# Patient Record
Sex: MALE | Race: WHITE | NOT HISPANIC OR LATINO
[De-identification: names, ages, dates, MRNs, and addresses within clinical notes are randomized per-mention and may not be internally consistent; named-entity substitution may affect disease eponyms.]

---

## 2018-09-03 PROBLEM — Z00.00 ENCOUNTER FOR PREVENTIVE HEALTH EXAMINATION: Status: ACTIVE | Noted: 2018-09-03

## 2018-10-03 ENCOUNTER — APPOINTMENT (OUTPATIENT)
Dept: ENDOCRINOLOGY | Facility: CLINIC | Age: 68
End: 2018-10-03
Payer: MEDICARE

## 2018-10-03 VITALS
HEIGHT: 63 IN | SYSTOLIC BLOOD PRESSURE: 126 MMHG | BODY MASS INDEX: 29.95 KG/M2 | DIASTOLIC BLOOD PRESSURE: 74 MMHG | WEIGHT: 169 LBS | HEART RATE: 59 BPM

## 2018-10-03 DIAGNOSIS — D56.9 THALASSEMIA, UNSPECIFIED: ICD-10-CM

## 2018-10-03 DIAGNOSIS — I25.10 ATHEROSCLEROTIC HEART DISEASE OF NATIVE CORONARY ARTERY W/OUT ANGINA PECTORIS: ICD-10-CM

## 2018-10-03 DIAGNOSIS — H26.9 UNSPECIFIED CATARACT: ICD-10-CM

## 2018-10-03 LAB
CREAT SERPL-MCNC: 0.68
GLUCOSE BLDC GLUCOMTR-MCNC: 144
HBA1C MFR BLD HPLC: 7.8
LDLC SERPL DIRECT ASSAY-MCNC: 102

## 2018-10-03 PROCEDURE — 99214 OFFICE O/P EST MOD 30 MIN: CPT | Mod: 25

## 2018-10-03 PROCEDURE — 82962 GLUCOSE BLOOD TEST: CPT

## 2018-10-03 RX ORDER — AMLODIPINE BESYLATE 10 MG/1
10 TABLET ORAL DAILY
Refills: 0 | Status: ACTIVE | COMMUNITY

## 2018-10-03 RX ORDER — REPAGLINIDE 1 MG/1
1 TABLET ORAL
Qty: 540 | Refills: 1 | Status: DISCONTINUED | COMMUNITY
End: 2018-10-03

## 2018-10-03 RX ORDER — IRBESARTAN 300 MG/1
300 TABLET ORAL DAILY
Refills: 0 | Status: ACTIVE | COMMUNITY

## 2018-10-05 RX ORDER — SITAGLIPTIN 100 MG/1
100 TABLET, FILM COATED ORAL
Qty: 90 | Refills: 1 | Status: DISCONTINUED | COMMUNITY
Start: 2018-10-03 | End: 2018-10-05

## 2018-10-10 ENCOUNTER — RX RENEWAL (OUTPATIENT)
Age: 68
End: 2018-10-10

## 2019-01-17 ENCOUNTER — RECORD ABSTRACTING (OUTPATIENT)
Age: 69
End: 2019-01-17

## 2019-01-17 DIAGNOSIS — Z78.9 OTHER SPECIFIED HEALTH STATUS: ICD-10-CM

## 2019-01-17 DIAGNOSIS — Z86.39 PERSONAL HISTORY OF OTHER ENDOCRINE, NUTRITIONAL AND METABOLIC DISEASE: ICD-10-CM

## 2019-01-17 DIAGNOSIS — Z83.3 FAMILY HISTORY OF DIABETES MELLITUS: ICD-10-CM

## 2019-01-17 DIAGNOSIS — Z86.79 PERSONAL HISTORY OF OTHER DISEASES OF THE CIRCULATORY SYSTEM: ICD-10-CM

## 2019-01-29 ENCOUNTER — APPOINTMENT (OUTPATIENT)
Dept: ENDOCRINOLOGY | Facility: CLINIC | Age: 69
End: 2019-01-29
Payer: MEDICARE

## 2019-01-29 VITALS
HEIGHT: 64 IN | DIASTOLIC BLOOD PRESSURE: 66 MMHG | BODY MASS INDEX: 28.68 KG/M2 | WEIGHT: 168 LBS | HEART RATE: 70 BPM | SYSTOLIC BLOOD PRESSURE: 126 MMHG

## 2019-01-29 LAB
CHOLEST SERPL-MCNC: 153
GLUCOSE BLDC GLUCOMTR-MCNC: 135
HBA1C MFR BLD HPLC: 6.6
LDLC SERPL DIRECT ASSAY-MCNC: 77
MICROALBUMIN/CREAT 24H UR-RTO: 31

## 2019-01-29 PROCEDURE — 99214 OFFICE O/P EST MOD 30 MIN: CPT | Mod: 25

## 2019-01-29 PROCEDURE — 82962 GLUCOSE BLOOD TEST: CPT

## 2019-02-18 ENCOUNTER — RX RENEWAL (OUTPATIENT)
Age: 69
End: 2019-02-18

## 2019-02-21 ENCOUNTER — RX RENEWAL (OUTPATIENT)
Age: 69
End: 2019-02-21

## 2019-02-26 ENCOUNTER — MEDICATION RENEWAL (OUTPATIENT)
Age: 69
End: 2019-02-26

## 2019-02-27 ENCOUNTER — MEDICATION RENEWAL (OUTPATIENT)
Age: 69
End: 2019-02-27

## 2019-02-28 RX ORDER — FENOFIBRATE 160 MG/1
160 TABLET ORAL DAILY
Qty: 90 | Refills: 1 | Status: ACTIVE | COMMUNITY
Start: 1900-01-01 | End: 1900-01-01

## 2019-04-01 ENCOUNTER — RX RENEWAL (OUTPATIENT)
Age: 69
End: 2019-04-01

## 2019-04-02 ENCOUNTER — RX RENEWAL (OUTPATIENT)
Age: 69
End: 2019-04-02

## 2019-05-08 ENCOUNTER — MEDICATION RENEWAL (OUTPATIENT)
Age: 69
End: 2019-05-08

## 2019-05-08 ENCOUNTER — RX RENEWAL (OUTPATIENT)
Age: 69
End: 2019-05-08

## 2019-05-23 ENCOUNTER — MEDICATION RENEWAL (OUTPATIENT)
Age: 69
End: 2019-05-23

## 2019-06-28 LAB
HBA1C MFR BLD HPLC: 7.4
LDLC SERPL CALC-MCNC: 67

## 2019-07-02 ENCOUNTER — APPOINTMENT (OUTPATIENT)
Dept: ENDOCRINOLOGY | Facility: CLINIC | Age: 69
End: 2019-07-02
Payer: MEDICARE

## 2019-07-02 VITALS
BODY MASS INDEX: 28.68 KG/M2 | OXYGEN SATURATION: 95 % | HEIGHT: 64 IN | SYSTOLIC BLOOD PRESSURE: 110 MMHG | WEIGHT: 168 LBS | HEART RATE: 68 BPM | DIASTOLIC BLOOD PRESSURE: 70 MMHG

## 2019-07-02 DIAGNOSIS — Z79.899 OTHER LONG TERM (CURRENT) DRUG THERAPY: ICD-10-CM

## 2019-07-02 DIAGNOSIS — R94.6 ABNORMAL RESULTS OF THYROID FUNCTION STUDIES: ICD-10-CM

## 2019-07-02 LAB — GLUCOSE BLDC GLUCOMTR-MCNC: 128

## 2019-07-02 PROCEDURE — 99214 OFFICE O/P EST MOD 30 MIN: CPT | Mod: 25

## 2019-07-02 PROCEDURE — 82962 GLUCOSE BLOOD TEST: CPT

## 2019-07-02 RX ORDER — REPAGLINIDE 1 MG/1
1 TABLET ORAL
Qty: 540 | Refills: 1 | Status: DISCONTINUED | COMMUNITY
Start: 1900-01-01 | End: 2019-07-02

## 2019-07-02 RX ORDER — BLOOD SUGAR DIAGNOSTIC
STRIP MISCELLANEOUS
Qty: 400 | Refills: 1 | Status: DISCONTINUED | COMMUNITY
End: 2019-07-02

## 2019-07-02 NOTE — DATA REVIEWED
[FreeTextEntry1] : Labs 9/28/18\par Cr 1.09\par LDl chol 67\par Tg 188\par A1c 7.8%\par TSH 4.720\par urine microalb/Cr 37\par \par Labs 1/24/19\par Cr 1.32, GFR 54\par LDL chol 77, Tg 163\par A1c 6.6%\par TSH 2.780, FT4 1.45\par urine microalb/Cr 31\par \par Labs 6/25/19\par Gluc 133, A1c 7.4\par CR 1.32, GFR 54\par LDL 67, HDL 40, Tg 132\par urine alb/Cr 50

## 2019-07-02 NOTE — REVIEW OF SYSTEMS
[Recent Weight Gain (___ Lbs)] : no recent weight gain [Recent Weight Loss (___ Lbs)] : no recent weight loss [Visual Field Defect] : no visual field defect [Blurry Vision] : no blurred vision [Chest Pain] : no chest pain [Palpitations] : no palpitations [Shortness Of Breath] : no shortness of breath [SOB on Exertion] : no shortness of breath during exertion [Vomiting] : no vomiting was observed [Nausea] : no nausea [Abdominal Pain] : no abdominal pain [Polyuria] : no polyuria [Nocturia] : no nocturia [Pain/Numbness of Digits] : no pain/numbness of digits [Depression] : no depression [Anxiety] : no anxiety [Polydipsia] : no polydipsia

## 2019-07-02 NOTE — HISTORY OF PRESENT ILLNESS
[FreeTextEntry1] : Brother killed in MVA in [place].\par \par Quality: Type 2\par Severity: moderate\par Duration: 2013\par Onset: blood test\par \par ASSOCIATED SYMPTOMS/COMPLICATIONS:\par (+) microalbuminuria 1/2019 on ARB\par (+) CAD s/p CABG 3 vessel 2004\par 1/8/19 eye exam no DR\par \par MODIFYING FACTORS: improved with meds, worsening since death of brother - very stressed/upset\par Diet: eats before flies (2 eggs/ham/roll), eats 3 meals daily\par Exercise: limited due to bad knee\par Current DM meds:\par \par Current DM meds: no meds before flight\par Prandin 1 mg 2 tabs with each meal\par  mg 2 tabs BID\par Alogliptin 25 mg daily\par \par SMBG: no logs, testing 4x daily, per pt: fasting numbers 180-190's, after meals 130's, bedtime 140's. denies lows.\par \par \par

## 2019-07-02 NOTE — ASSESSMENT
[FreeTextEntry1] : 1. T2DM comp by CAD s/p CABG and microalbuminuria - suboptimal A1c, fasting hyperglycemia\par - try bedtime snack\par - risks of worsening diabetes discussed\par - cont MFN, cont Prandin, cont Alogliptin\par - pt refused insulin, GLP1 agonist, SGLT2 inhibitors. not candidate for TZD (does not want to take any DM meds that is no FAA approved b/c he is a ), will check FAA list\par - pt refused Freestyle Terrie sensor\par - cont to monitor FS, brad before flight\par - cont ARB\par - cardio f/u\par \par 2. HLD - LDL ok, cont statin\par \par 3. HTN - BP ok, cont current BP meds

## 2019-07-02 NOTE — ADDENDUM
[FreeTextEntry1] : looked at Health system website - list updated 6/29/19 - pt allowed up to 3 oral DM meds, 4 meds required examination with FFA medical examiner.  ALogliptin and Prandin is not an acceptable combination.  Stop Prandin and switch to Glipizide 5 mg daily. pt advised to test more on the new med and call with problems.  hold off on flying until he has time to see how new med is working for him

## 2019-07-02 NOTE — PHYSICAL EXAM
[Alert] : alert [No Acute Distress] : no acute distress [Well Nourished] : well nourished [Well Developed] : well developed [Normal Sclera/Conjunctiva] : normal sclera/conjunctiva [EOMI] : extra ocular movement intact [Normal Rate and Effort] : normal respiratory rhythm and effort [Clear to Auscultation] : lungs were clear to auscultation bilaterally [Normal Rate] : heart rate was normal  [Normal S1, S2] : normal S1 and S2 [No Edema] : there was no peripheral edema [Normal Bowel Sounds] : normal bowel sounds [Not Tender] : non-tender [Soft] : abdomen soft [Not Distended] : not distended [Normal Gait] : normal gait [Cranial Nerves Intact] : cranial nerves 2-12 were intact [Normal Reflexes] : deep tendon reflexes were 2+ and symmetric [Oriented x3] : oriented to person, place, and time [Normal Insight/Judgement] : insight and judgment were intact [Normal Affect] : the affect was normal [Normal Mood] : the mood was normal [Right Foot Was Examined] : right foot ~C was examined [Left Foot Was Examined] : left foot ~C was examined [2+] : 2+ in the dorsalis pedis [Foot Ulcers] : no foot ulcers [Vibration Dec.] : normal vibratory sensation at the level of the toes [Position Sense Dec.] : normal position sense at the level of the toes

## 2019-07-03 ENCOUNTER — TRANSCRIPTION ENCOUNTER (OUTPATIENT)
Age: 69
End: 2019-07-03

## 2019-07-03 ENCOUNTER — MEDICATION RENEWAL (OUTPATIENT)
Age: 69
End: 2019-07-03

## 2019-11-04 LAB
HBA1C MFR BLD HPLC: 7.3
LDLC SERPL DIRECT ASSAY-MCNC: 54

## 2019-11-05 ENCOUNTER — APPOINTMENT (OUTPATIENT)
Dept: ENDOCRINOLOGY | Facility: CLINIC | Age: 69
End: 2019-11-05
Payer: MEDICARE

## 2019-11-05 VITALS
WEIGHT: 168 LBS | HEIGHT: 64 IN | HEART RATE: 61 BPM | SYSTOLIC BLOOD PRESSURE: 148 MMHG | DIASTOLIC BLOOD PRESSURE: 68 MMHG | BODY MASS INDEX: 28.68 KG/M2

## 2019-11-05 VITALS — SYSTOLIC BLOOD PRESSURE: 136 MMHG | DIASTOLIC BLOOD PRESSURE: 70 MMHG

## 2019-11-05 DIAGNOSIS — R74.0 NONSPECIFIC ELEVATION OF LEVELS OF TRANSAMINASE AND LACTIC ACID DEHYDROGENASE [LDH]: ICD-10-CM

## 2019-11-05 DIAGNOSIS — Z12.5 ENCOUNTER FOR SCREENING FOR MALIGNANT NEOPLASM OF PROSTATE: ICD-10-CM

## 2019-11-05 LAB — GLUCOSE BLDC GLUCOMTR-MCNC: 103

## 2019-11-05 PROCEDURE — 99215 OFFICE O/P EST HI 40 MIN: CPT | Mod: 25

## 2019-11-05 PROCEDURE — 82962 GLUCOSE BLOOD TEST: CPT

## 2019-11-05 NOTE — DATA REVIEWED
[FreeTextEntry1] : Labs 9/28/18\par Cr 1.09\par LDl chol 67\par Tg 188\par A1c 7.8%\par TSH 4.720\par urine microalb/Cr 37\par \par Labs 1/24/19\par Cr 1.32, GFR 54\par LDL chol 77, Tg 163\par A1c 6.6%\par TSH 2.780, FT4 1.45\par urine microalb/Cr 31\par \par Labs 6/25/19\par Gluc 133, A1c 7.4\par CR 1.32, GFR 54\par LDL 67, HDL 40, Tg 132\par urine alb/Cr 50\par \par Labs 10/30/19\par Gluc 134, A1c 7.3%\par Cr 1.32, GFR 54\par ALT 57\par LDL 54, HDL 43, Tg 154\par B12 866

## 2019-11-05 NOTE — ASSESSMENT
[FreeTextEntry1] : 1. T2DM comp by CAD s/p CABG and microalbuminuria - suboptimal A1c, fasting hyperglycemia\par - cont MFN, cont Alogliptin\par - increase Glipizide 5 mg BID\par - pt refused insulin, GLP1 agonist, SGLT2 inhibitors. not candidate for TZD (does not want to take any DM meds that is no FAA approved b/c he is a )\par - pt refused Freestyle Terrie sensor\par - cont to monitor FS, brad before flight\par - cont ARB\par - cardio f/u\par FAA website - list updated 6/29/19 - pt allowed up to 3 oral DM meds, 4 meds required examination with FFA medical examiner.  ALogliptin and Prandin is not an acceptable combination.  Stop Prandin and switch to Glipizide 5 mg daily. pt advised to test more on the new med and call with problems.  hold off on flying until he has time to see how new med is working for him\par \par 2. HLD - LDL ok, cont statin\par \par 3. HTN - BP elevated, cont current BP meds, advised cardio f/u\par \par 4. elevated ALT - PCP f/u\par \par 5. pt requesting PSA screen\par \par

## 2019-11-05 NOTE — HISTORY OF PRESENT ILLNESS
[FreeTextEntry1] : no issues today\par \par Quality: Type 2\par Severity: moderate\par Duration: 2013\par Onset: blood test\par \par ASSOCIATED SYMPTOMS/COMPLICATIONS:\par (+) microalbuminuria 1/2019 on ARB\par (+) CAD s/p CABG 3 vessel 2004\par 1/8/19 eye exam no DR\par \par MODIFYING FACTORS: improved with meds, worsening since death of brother and limited exercise\par Diet: eats before flies (2 eggs/ham/roll), eats 3 meals daily, crackers at bedtime\par Exercise: limited due to bad knee, and torn rotator cuff \par \par Current DM meds: no meds before flight\par Glipizide 5 mg daily in am\par  mg 2 tabs BID\par Alogliptin 25 mg daily in am\par \par SMBG: no logs, testing 4x daily, per pt: fasting numbers 170's, after meals 120-130's denies lows.\par \par \par

## 2019-11-05 NOTE — REVIEW OF SYSTEMS
[Recent Weight Gain (___ Lbs)] : no recent weight gain [Recent Weight Loss (___ Lbs)] : no recent weight loss [Visual Field Defect] : no visual field defect [Blurry Vision] : no blurred vision [Chest Pain] : no chest pain [Palpitations] : no palpitations [Shortness Of Breath] : no shortness of breath [SOB on Exertion] : no shortness of breath during exertion [Nausea] : no nausea [Vomiting] : no vomiting was observed [Abdominal Pain] : no abdominal pain [Polyuria] : no polyuria [Nocturia] : no nocturia [Pain/Numbness of Digits] : no pain/numbness of digits [Depression] : no depression [Anxiety] : no anxiety [Polydipsia] : no polydipsia

## 2019-11-05 NOTE — PHYSICAL EXAM
[Alert] : alert [No Acute Distress] : no acute distress [Well Nourished] : well nourished [Well Developed] : well developed [Normal Sclera/Conjunctiva] : normal sclera/conjunctiva [EOMI] : extra ocular movement intact [Normal Rate and Effort] : normal respiratory rhythm and effort [Clear to Auscultation] : lungs were clear to auscultation bilaterally [Normal Rate] : heart rate was normal  [Normal S1, S2] : normal S1 and S2 [No Edema] : there was no peripheral edema [Normal Bowel Sounds] : normal bowel sounds [Not Tender] : non-tender [Soft] : abdomen soft [Not Distended] : not distended [Normal Gait] : normal gait [Cranial Nerves Intact] : cranial nerves 2-12 were intact [Normal Reflexes] : deep tendon reflexes were 2+ and symmetric [Oriented x3] : oriented to person, place, and time [Normal Insight/Judgement] : insight and judgment were intact [Normal Affect] : the affect was normal [Normal Mood] : the mood was normal

## 2020-03-13 ENCOUNTER — RX RENEWAL (OUTPATIENT)
Age: 70
End: 2020-03-13

## 2020-05-22 ENCOUNTER — RX RENEWAL (OUTPATIENT)
Age: 70
End: 2020-05-22

## 2020-07-07 ENCOUNTER — APPOINTMENT (OUTPATIENT)
Dept: ENDOCRINOLOGY | Facility: CLINIC | Age: 70
End: 2020-07-07
Payer: MEDICARE

## 2020-07-07 PROCEDURE — 99442: CPT | Mod: 95

## 2020-07-07 NOTE — HISTORY OF PRESENT ILLNESS
[FreeTextEntry1] : Interval Hx - not flying since pandemic. has been self isolating since 3/2020.  has not been to eye doctor, dentist and lab for blood draw. doing well and no symptoms.  \par \par Quality: Type 2\par Severity: moderate\par Duration: 2013\par Onset: blood test\par \par ASSOCIATED SYMPTOMS/COMPLICATIONS:\par (+) microalbuminuria 1/2019 on ARB\par (+) CAD s/p CABG 3 vessel 2004\par 1/8/19 eye exam no DR\par \par MODIFYING FACTORS: improved with meds\par Diet: worse due to diet., nonadherent with diet during pandemic\par Exercise: limited due to bad knee, and torn rotator cuff \par \par Current DM meds: no meds before flight\par Glipizide 5 mg daily in am\par  mg 2 tabs BID\par Alogliptin 25 mg daily in am\par \par SMBG: testing 4x daily,  fasting 's, 2 hours post meals 160's\par ====================================\par HLD  on simvastatin 80 mg nightly, fenofibrate 160 mg daily, ezetimibe 10 mg daily\par ====================================\par \par

## 2020-07-07 NOTE — REVIEW OF SYSTEMS
[Recent Weight Gain (___ Lbs)] : recent weight gain: [unfilled] lbs [Blurred Vision] : no blurred vision [Shortness Of Breath] : no shortness of breath [Chest Pain] : no chest pain [SOB on Exertion] : no shortness of breath on exertion [Nausea] : no nausea [Abdominal Pain] : no abdominal pain [Polyuria] : no polyuria [Pain/Numbness of Digits] : no pain/numbness of digits [Nocturia] : no nocturia [Depression] : no depression [Anxiety] : no anxiety [Polydipsia] : no polydipsia

## 2020-07-07 NOTE — ASSESSMENT
[FreeTextEntry1] : 1. T2DM comp by CAD s/p CABG and microalbuminuria - worsening control by history\par - cont MFN, cont Alogliptin\par - cont Glipizide 5 mg BID, may need dose increase but need to see labs first\par - pt refused insulin, GLP1 agonist, SGLT2 inhibitors. not candidate for TZD (does not want to take any DM meds that is no FAA approved b/c he is a )\par - cont to monitor FS, brad before flight\par - cont ARB\par - cardio f/u\par \par 2. HLD - cont statin\par \par 3. HTN -cont current BP meds\par \par \par

## 2020-07-07 NOTE — REASON FOR VISIT
[Medical Office: ([facility])___] : at the medical office located in  [Home] : at home, [unfilled] , at the time of the visit. [Verbal consent obtained from patient] : the patient, [unfilled] [Follow - Up] : a follow-up visit [Other___] : [unfilled] [DM Type 2] : DM Type 2

## 2020-11-10 ENCOUNTER — RX RENEWAL (OUTPATIENT)
Age: 70
End: 2020-11-10

## 2020-11-24 ENCOUNTER — APPOINTMENT (OUTPATIENT)
Dept: ENDOCRINOLOGY | Facility: CLINIC | Age: 70
End: 2020-11-24
Payer: MEDICARE

## 2020-11-24 PROCEDURE — 99442: CPT | Mod: 95

## 2020-11-24 RX ORDER — EZETIMIBE AND SIMVASTATIN 10; 80 MG/1; MG/1
10-80 TABLET ORAL
Qty: 90 | Refills: 0 | Status: ACTIVE | COMMUNITY
Start: 2020-11-19

## 2020-11-24 RX ORDER — ASPIRIN 81 MG/1
81 TABLET, CHEWABLE ORAL
Refills: 0 | Status: ACTIVE | COMMUNITY

## 2020-11-24 RX ORDER — SIMVASTATIN 80 MG/1
80 TABLET, FILM COATED ORAL DAILY
Qty: 90 | Refills: 1 | Status: DISCONTINUED | COMMUNITY
End: 2020-11-24

## 2020-11-24 RX ORDER — METFORMIN HYDROCHLORIDE 500 MG/1
500 TABLET, COATED ORAL
Qty: 360 | Refills: 1 | Status: DISCONTINUED | COMMUNITY
Start: 2019-05-08 | End: 2020-11-24

## 2020-11-24 RX ORDER — EZETIMIBE 10 MG/1
10 TABLET ORAL
Refills: 0 | Status: DISCONTINUED | COMMUNITY
End: 2020-11-24

## 2020-11-24 NOTE — HISTORY OF PRESENT ILLNESS
[FreeTextEntry1] : Interval Hx - not flying since pandemic. reports fasting hyperglycemia 's. (+) weight loss\par \par Quality: Type 2\par Severity: moderate\par Duration: 2013\par Onset: blood test\par \par ASSOCIATED SYMPTOMS/COMPLICATIONS:\par (+) microalbuminuria 1/2019 on ARB\par (+) CAD s/p CABG 3 vessel 2004\par 1/8/19 eye exam no DR\par \par MODIFYING FACTORS: improved with meds\par Diet: worse due to diet., nonadherent with diet during pandemic\par Exercise: limited due to bad knee, and torn rotator cuff \par \par Current DM meds: no meds before flight\par Glipizide 5 mg BID\par  mg 2 tabs BID\par Alogliptin 25 mg daily in am\par \par SMBG: testing 4x daily,  fasting 160-200's, daytime -170's. denies hypoglycemia\par ====================================\par HLD  on simvastatin 80 mg nightly, fenofibrate 160 mg daily, ezetimibe 10 mg daily\par ====================================\par \par

## 2020-11-24 NOTE — ASSESSMENT
[FreeTextEntry1] : 1. T2DM comp by CAD s/p CABG and microalbuminuria - worsening control by history w fasting hyperglycemia\par - cont Alogliptin\par - change MFN  mg 2 tabs BID\par - cont Glipizide 5 mg BID, take before BF and Dinner\par - pt refused insulin, GLP1 agonist, SGLT2 inhibitors. not candidate for TZD (does not want to take any DM meds that is not FAA approved b/c he is a )\par - cont to monitor FS, brad before flight\par - cont ARB\par - cardio f/u\par - needs dilated eye exam w ophthalmology\par \par 2. HLD - cont vytorin\par \par 3. HTN -cont current BP meds\par \par \par

## 2020-11-24 NOTE — REVIEW OF SYSTEMS
[Recent Weight Gain (___ Lbs)] : recent weight gain: [unfilled] lbs [Blurred Vision] : no blurred vision [Chest Pain] : no chest pain [Shortness Of Breath] : no shortness of breath [SOB on Exertion] : no shortness of breath on exertion [Nausea] : no nausea [Abdominal Pain] : no abdominal pain [Polyuria] : no polyuria [Nocturia] : no nocturia [Pain/Numbness of Digits] : no pain/numbness of digits [Depression] : no depression [Anxiety] : no anxiety [Polydipsia] : no polydipsia

## 2020-11-24 NOTE — REASON FOR VISIT
[Follow - Up] : a follow-up visit [DM Type 2] : DM Type 2 [Other___] : [unfilled] [Home] : at home, [unfilled] , at the time of the visit. [Medical Office: ([facility])___] : at the medical office located in  [Verbal consent obtained from patient] : the patient, [unfilled]

## 2020-11-24 NOTE — DATA REVIEWED
[FreeTextEntry1] : Labs 9/28/18\par Cr 1.09\par LDl chol 67\par Tg 188\par A1c 7.8%\par TSH 4.720\par urine microalb/Cr 37\par \par Labs 1/24/19\par Cr 1.32, GFR 54\par LDL chol 77, Tg 163\par A1c 6.6%\par TSH 2.780, FT4 1.45\par urine microalb/Cr 31\par \par Labs 6/25/19\par Gluc 133, A1c 7.4\par CR 1.32, GFR 54\par LDL 67, HDL 40, Tg 132\par urine alb/Cr 50\par \par Labs 10/30/19\par Gluc 134, A1c 7.3%\par Cr 1.32, GFR 54\par ALT 57\par LDL 54, HDL 43, Tg 154\par B12 866\par \par labs 11/18/20 \par gluc 191, A1c 7.7\par Cr 1.21. GFR 59\par LDL 84, Tg 180, HDL 40\par TSH 2.520\par urine alb/Cr 109

## 2020-11-25 DIAGNOSIS — I10 ESSENTIAL (PRIMARY) HYPERTENSION: ICD-10-CM

## 2020-12-01 ENCOUNTER — RX RENEWAL (OUTPATIENT)
Age: 70
End: 2020-12-01

## 2021-02-26 LAB
HBA1C MFR BLD HPLC: 7.9
LDLC SERPL DIRECT ASSAY-MCNC: 51
MICROALBUMIN/CREAT 24H UR-RTO: 115

## 2021-02-27 ENCOUNTER — APPOINTMENT (OUTPATIENT)
Dept: ENDOCRINOLOGY | Facility: CLINIC | Age: 71
End: 2021-02-27
Payer: MEDICARE

## 2021-02-27 PROCEDURE — 99443: CPT | Mod: 95

## 2021-02-27 RX ORDER — GLIPIZIDE 5 MG/1
5 TABLET ORAL TWICE DAILY
Qty: 180 | Refills: 1 | Status: DISCONTINUED | COMMUNITY
Start: 2019-07-02 | End: 2021-02-27

## 2021-02-27 RX ORDER — ALOGLIPTIN 25 MG/1
25 TABLET, FILM COATED ORAL
Qty: 90 | Refills: 1 | Status: DISCONTINUED | COMMUNITY
Start: 2018-10-05 | End: 2021-02-27

## 2021-02-27 NOTE — HISTORY OF PRESENT ILLNESS
[FreeTextEntry1] : Interval Hx - not flying since pandemic. \par /69 this am\par  this am, reports fasting hyperglycemia\par insurance will no longer cover alogliptin but prefer Tradjenta\par feets okay\par \par Quality: Type 2\par Severity: moderate\par Duration: 2013\par Onset: blood test\par \par ASSOCIATED SYMPTOMS/COMPLICATIONS:\par (+) microalbuminuria 1/2019 on ARB\par (+) CAD s/p CABG 3 vessel 2004\par 1/8/19 eye exam no DR\par \par MODIFYING FACTORS: worsening due to decreased activity\par Exercise: limited due to bad knee, and torn rotator cuff \par \par Current DM meds: no meds before flight\par Glipizide 5 mg BID before meals\par MFN  mg 2 tabs BID\par Alogliptin 25 mg daily in am\par \par SMBG: testing 3x daily, reports fasting hyperglycemia. am -160's, daytimes 160-180's. denies hypoglycemia\par ====================================\par HLD  on simvastatin 80 mg nightly, fenofibrate 160 mg daily, ezetimibe 10 mg daily\par ====================================\par \par

## 2021-02-27 NOTE — ASSESSMENT
[FreeTextEntry1] : 1. T2DM comp by CAD s/p CABG and microalbuminuria - worsening control\par - cont Alogliptin, change to Tradjenta due to insurance\par - cont MFN  mg 2 tabs BID\par - cont Glipizide 5 mg BID, take before BF and Dinner but change to ER\par - pt refused insulin,  not candidate for TZD, does not want to take any DM meds that is not FAA approved b/c he is a , ATIF cooper reviewed - FAA does not allow SGLT2i w YANES, will consider [id number] to replace Tradjenta in 1 month if FS not improved w Glipizide ER\par - cont to monitor FS, brad before flight, call office in 1 month w FS logs\par - cont ARB\par - cardio f/u\par - needs dilated eye exam w ophthalmology\par \par 2. HLD - cont vytorin and fibrate\par \par \par \par

## 2021-02-27 NOTE — DATA REVIEWED
[FreeTextEntry1] : Labs 9/28/18\par Cr 1.09\par LDl chol 67\par Tg 188\par A1c 7.8%\par TSH 4.720\par urine microalb/Cr 37\par \par Labs 1/24/19\par Cr 1.32, GFR 54\par LDL chol 77, Tg 163\par A1c 6.6%\par TSH 2.780, FT4 1.45\par urine microalb/Cr 31\par \par Labs 6/25/19\par Gluc 133, A1c 7.4\par CR 1.32, GFR 54\par LDL 67, HDL 40, Tg 132\par urine alb/Cr 50\par \par Labs 10/30/19\par Gluc 134, A1c 7.3%\par Cr 1.32, GFR 54\par ALT 57\par LDL 54, HDL 43, Tg 154\par B12 866\par \par labs 11/18/20 \par gluc 191, A1c 7.7\par Cr 1.21. GFR 59\par LDL 84, Tg 180, HDL 40\par TSH 2.520\par urine alb/Cr 109\par \par Labs 2/23/21\par Gluc 212, A1c 7.9\par Cr 1.08, GFR 68\par LDL 51, Tg 180, HDL 39\par TSH 2.82\par B12 679\par urine alb/Cr 115

## 2021-02-27 NOTE — REASON FOR VISIT
[Follow - Up] : a follow-up visit [DM Type 2] : DM Type 2 [Other___] : [unfilled] [Home] : at home, [unfilled] , at the time of the visit. [Medical Office: ([facility])___] : at the medical office located in  [Verbal consent obtained from patient] : the patient, [unfilled]

## 2021-03-09 ENCOUNTER — RX RENEWAL (OUTPATIENT)
Age: 71
End: 2021-03-09

## 2021-05-10 ENCOUNTER — RX RENEWAL (OUTPATIENT)
Age: 71
End: 2021-05-10

## 2021-05-11 ENCOUNTER — RX RENEWAL (OUTPATIENT)
Age: 71
End: 2021-05-11

## 2021-05-28 LAB
HBA1C MFR BLD HPLC: 8.4
LDLC SERPL DIRECT ASSAY-MCNC: 59

## 2021-05-29 ENCOUNTER — APPOINTMENT (OUTPATIENT)
Dept: ENDOCRINOLOGY | Facility: CLINIC | Age: 71
End: 2021-05-29

## 2021-06-01 ENCOUNTER — APPOINTMENT (OUTPATIENT)
Dept: ENDOCRINOLOGY | Facility: CLINIC | Age: 71
End: 2021-06-01
Payer: MEDICARE

## 2021-06-01 VITALS
HEIGHT: 64 IN | HEART RATE: 70 BPM | BODY MASS INDEX: 29.37 KG/M2 | DIASTOLIC BLOOD PRESSURE: 80 MMHG | SYSTOLIC BLOOD PRESSURE: 134 MMHG | WEIGHT: 172 LBS

## 2021-06-01 LAB — GLUCOSE BLDC GLUCOMTR-MCNC: 175

## 2021-06-01 PROCEDURE — 82962 GLUCOSE BLOOD TEST: CPT

## 2021-06-01 PROCEDURE — 99214 OFFICE O/P EST MOD 30 MIN: CPT | Mod: 25

## 2021-06-01 NOTE — HISTORY OF PRESENT ILLNESS
[FreeTextEntry1] : Interval Hx - started flying again, worsening diabetes, increased stress lately\par \par Quality: Type 2\par Severity: moderate\par Duration: 2013\par Onset: blood test\par \par ASSOCIATED SYMPTOMS/COMPLICATIONS:\par (+) microalbuminuria 1/2019 on ARB\par (+) CAD s/p CABG 3 vessel 2004\par 1/8/19 eye exam no DR\par \par MODIFYING FACTORS: worsening due to decreased activity\par Exercise: limited due to bad knee, and torn rotator cuff \par \par Current DM meds: no meds before flight\par Glipizide 5 mg BID before meals\par MFN  mg 2 tabs BID\par Tradjenta 5 mg daily\par \par SMBG: testing 3x daily, reports fasting hyperglycemia. am -160's, daytimes 160-180's. denies hypoglycemia\par ====================================\par HLD  on simvastatin 80 mg nightly, fenofibrate 160 mg daily, ezetimibe 10 mg daily\par ====================================\par \par

## 2021-06-01 NOTE — DATA REVIEWED
[FreeTextEntry1] : Labs 9/28/18\par Cr 1.09\par LDl chol 67\par Tg 188\par A1c 7.8%\par TSH 4.720\par urine microalb/Cr 37\par \par Labs 1/24/19\par Cr 1.32, GFR 54\par LDL chol 77, Tg 163\par A1c 6.6%\par TSH 2.780, FT4 1.45\par urine microalb/Cr 31\par \par Labs 6/25/19\par Gluc 133, A1c 7.4\par CR 1.32, GFR 54\par LDL 67, HDL 40, Tg 132\par urine alb/Cr 50\par \par Labs 10/30/19\par Gluc 134, A1c 7.3%\par Cr 1.32, GFR 54\par ALT 57\par LDL 54, HDL 43, Tg 154\par B12 866\par \par labs 11/18/20 \par gluc 191, A1c 7.7\par Cr 1.21. GFR 59\par LDL 84, Tg 180, HDL 40\par TSH 2.520\par urine alb/Cr 109\par \par Labs 2/23/21\par Gluc 212, A1c 7.9\par Cr 1.08, GFR 68\par LDL 51, Tg 180, HDL 39\par TSH 2.82\par B12 679\par urine alb/Cr 115\par \par Labs 5/26/21\par Gluc 143, A1c 8.4\par Cr 1.06, GFR 69\par LDL 59, HDL 38, Tg 154

## 2021-06-01 NOTE — PHYSICAL EXAM
[Alert] : alert [Well Nourished] : well nourished [Healthy Appearance] : healthy appearance [No Acute Distress] : no acute distress [EOMI] : extra ocular movement intact [No LAD] : no lymphadenopathy [No Thyroid Nodules] : no palpable thyroid nodules [No Accessory Muscle Use] : no accessory muscle use [Clear to Auscultation] : lungs were clear to auscultation bilaterally [Normal S1, S2] : normal S1 and S2 [Normal Rate] : heart rate was normal [No Edema] : no peripheral edema [Normal Bowel Sounds] : normal bowel sounds [Not Tender] : non-tender [Soft] : abdomen soft [Normal Gait] : normal gait [Foot Ulcers] : no foot ulcers [2+] : 2+ in the dorsalis pedis [Vibration Dec.] : normal vibratory sensation at the level of the toes [Position Sense Dec.] : normal position sense at the level of the toes [Diminished Throughout Both Feet] : normal tactile sensation with monofilament testing throughout both feet [Oriented x3] : oriented to person, place, and time [Normal Affect] : the affect was normal [Normal Insight/Judgement] : insight and judgment were intact [Normal Mood] : the mood was normal

## 2021-06-01 NOTE — ASSESSMENT
[FreeTextEntry1] : 1. T2DM comp by CAD s/p CABG and microalbuminuria - worsening control\par - cont TRadjenta 5 mg daily\par - cont MFN  mg 2 tabs BID\par - cont Glipizide ER 5 mg BID\par - pt refused insulin,  not candidate for TZD, does not want to take any DM meds that is not FAA approved b/c he is a , ATIF cooper reviewed - FAA does not allow SGLT2i w YANES, will consider [id number] to replace Tradjenta or consider replacing SGLT2 inihbitor for YANES.  Risks/benefits of GLP1a and SGLTi discussed - pt will think about it and let me know\par - cont to monitor FS, brad before flight\par - cont ARB\par - cardio f/u\par - needs dilated eye exam w ophthalmology\par \par 2. HLD - cont vytorin and fibrate\par \par \par \par

## 2021-08-19 ENCOUNTER — RX RENEWAL (OUTPATIENT)
Age: 71
End: 2021-08-19

## 2021-08-24 ENCOUNTER — RX RENEWAL (OUTPATIENT)
Age: 71
End: 2021-08-24

## 2021-09-21 LAB
HBA1C MFR BLD HPLC: 6.5
LDLC SERPL DIRECT ASSAY-MCNC: 53
MICROALBUMIN/CREAT 24H UR-RTO: 274

## 2021-09-22 ENCOUNTER — APPOINTMENT (OUTPATIENT)
Dept: ENDOCRINOLOGY | Facility: CLINIC | Age: 71
End: 2021-09-22
Payer: MEDICARE

## 2021-09-22 VITALS
WEIGHT: 158 LBS | HEART RATE: 60 BPM | DIASTOLIC BLOOD PRESSURE: 80 MMHG | OXYGEN SATURATION: 98 % | SYSTOLIC BLOOD PRESSURE: 120 MMHG | BODY MASS INDEX: 26.98 KG/M2 | HEIGHT: 64 IN

## 2021-09-22 DIAGNOSIS — I48.91 UNSPECIFIED ATRIAL FIBRILLATION: ICD-10-CM

## 2021-09-22 DIAGNOSIS — K35.80 UNSPECIFIED ACUTE APPENDICITIS: ICD-10-CM

## 2021-09-22 LAB — GLUCOSE BLDC GLUCOMTR-MCNC: 104

## 2021-09-22 PROCEDURE — 82962 GLUCOSE BLOOD TEST: CPT

## 2021-09-22 PROCEDURE — 99214 OFFICE O/P EST MOD 30 MIN: CPT | Mod: 25

## 2021-09-22 RX ORDER — ATENOLOL 50 MG/1
50 TABLET ORAL DAILY
Refills: 0 | Status: DISCONTINUED | COMMUNITY
End: 2021-09-22

## 2021-09-22 RX ORDER — METOPROLOL SUCCINATE 200 MG/1
200 TABLET, EXTENDED RELEASE ORAL DAILY
Refills: 0 | Status: ACTIVE | COMMUNITY

## 2021-09-22 RX ORDER — APIXABAN 5 MG/1
5 TABLET, FILM COATED ORAL
Refills: 0 | Status: ACTIVE | COMMUNITY

## 2021-09-22 NOTE — DATA REVIEWED
[FreeTextEntry1] : Labs 9/28/18\par Cr 1.09\par LDl chol 67\par Tg 188\par A1c 7.8%\par TSH 4.720\par urine microalb/Cr 37\par \par Labs 1/24/19\par Cr 1.32, GFR 54\par LDL chol 77, Tg 163\par A1c 6.6%\par TSH 2.780, FT4 1.45\par urine microalb/Cr 31\par \par Labs 6/25/19\par Gluc 133, A1c 7.4\par CR 1.32, GFR 54\par LDL 67, HDL 40, Tg 132\par urine alb/Cr 50\par \par Labs 10/30/19\par Gluc 134, A1c 7.3%\par Cr 1.32, GFR 54\par ALT 57\par LDL 54, HDL 43, Tg 154\par B12 866\par \par labs 11/18/20 \par gluc 191, A1c 7.7\par Cr 1.21. GFR 59\par LDL 84, Tg 180, HDL 40\par TSH 2.520\par urine alb/Cr 109\par \par Labs 2/23/21\par Gluc 212, A1c 7.9\par Cr 1.08, GFR 68\par LDL 51, Tg 180, HDL 39\par TSH 2.82\par B12 679\par urine alb/Cr 115\par \par Labs 5/26/21\par Gluc 143, A1c 8.4\par Cr 1.06, GFR 69\par LDL 59, HDL 38, Tg 154\par \par Labs 9/17/21\par Gluc 107, A1c 6.5\par Cr 0.97, GFR 77\par HDL 39, LDL 53, Tg 94\par TSH 2.68\par Hb 12.5\par B12 734\par urine alb/Cr 274

## 2021-09-22 NOTE — PHYSICAL EXAM
[Alert] : alert [Well Nourished] : well nourished [Healthy Appearance] : healthy appearance [No Acute Distress] : no acute distress [EOMI] : extra ocular movement intact [No LAD] : no lymphadenopathy [No Thyroid Nodules] : no palpable thyroid nodules [No Accessory Muscle Use] : no accessory muscle use [Clear to Auscultation] : lungs were clear to auscultation bilaterally [Normal S1, S2] : normal S1 and S2 [Normal Rate] : heart rate was normal [No Edema] : no peripheral edema [Normal Bowel Sounds] : normal bowel sounds [Not Tender] : non-tender [Soft] : abdomen soft [Normal Gait] : normal gait [Oriented x3] : oriented to person, place, and time [Normal Affect] : the affect was normal [Normal Insight/Judgement] : insight and judgment were intact [Normal Mood] : the mood was normal

## 2021-09-22 NOTE — REVIEW OF SYSTEMS
[Recent Weight Loss (___ Lbs)] : recent weight loss: [unfilled] lbs [Blurred Vision] : no blurred vision [Chest Pain] : no chest pain [Shortness Of Breath] : no shortness of breath [SOB on Exertion] : no shortness of breath on exertion [Nausea] : no nausea [Abdominal Pain] : no abdominal pain [Polyuria] : no polyuria [Nocturia] : no nocturia [Pain/Numbness of Digits] : no pain/numbness of digits [Depression] : no depression [Anxiety] : no anxiety [Polydipsia] : no polydipsia

## 2021-09-22 NOTE — HISTORY OF PRESENT ILLNESS
[FreeTextEntry1] : Interval Hx - "June 2021 had gallbladder attack and went to [facility] and Dx with acute/burst appendicitis with abnormal labs, high BP, tachycardia w Atrial fibrrillation and hospitalized for 2 weeks. Initially giving Abx, insulin and no surgery and discharged home"  SInce then sugars have been well controlled.\par \par Quality: Type 2\par Severity: moderate\par Duration: 2013\par Onset: blood test\par \par ASSOCIATED SYMPTOMS/COMPLICATIONS:\par (+) albuminuria  on ARB\par (+) CAD s/p CABG 3 vessel 2004 s/p nuclear stress test normal\par 1/8/19 eye exam no DR\par \par MODIFYING FACTORS: worsening due to decreased activity\par Exercise: limited due to bad knee, and torn rotator cuff \par \par Current DM meds: no meds before flight\par Glipizide 5 mg BID before meals\par MFN  mg 2 tabs BID\par Tradjenta 5 mg daily\par \par SMBG: testing 4x daily, reports improve -120's. denies hypoglycemia\par ====================================\par HLD  on simvastatin 80 mg nightly, fenofibrate 160 mg daily, ezetimibe 10 mg daily\par ====================================\par \par

## 2021-09-22 NOTE — ASSESSMENT
[FreeTextEntry1] : 1. T2DM comp by CAD s/p CABG and microalbuminuria - improved control after treatment of acute appendicitis, he is asymptomatic\par - cont Tradjenta 5 mg daily\par - cont MFN  mg 2 tabs BID\par - cont Glipizide ER 5 mg BID\par - cont to monitor FS, brad before flight\par - cont ARB\par - cardio f/u\par - needs dilated eye exam w ophthalmology\par \par 2. HLD - cont vytorin and fibrate\par \par \par \par

## 2021-11-06 ENCOUNTER — RX RENEWAL (OUTPATIENT)
Age: 71
End: 2021-11-06

## 2021-12-02 ENCOUNTER — RX RENEWAL (OUTPATIENT)
Age: 71
End: 2021-12-02

## 2022-02-12 ENCOUNTER — RX RENEWAL (OUTPATIENT)
Age: 72
End: 2022-02-12

## 2022-02-20 ENCOUNTER — RX RENEWAL (OUTPATIENT)
Age: 72
End: 2022-02-20

## 2022-03-02 ENCOUNTER — APPOINTMENT (OUTPATIENT)
Dept: ENDOCRINOLOGY | Facility: CLINIC | Age: 72
End: 2022-03-02
Payer: MEDICARE

## 2022-03-02 VITALS
DIASTOLIC BLOOD PRESSURE: 80 MMHG | BODY MASS INDEX: 28.85 KG/M2 | SYSTOLIC BLOOD PRESSURE: 135 MMHG | OXYGEN SATURATION: 98 % | HEIGHT: 64 IN | HEART RATE: 58 BPM | WEIGHT: 169 LBS

## 2022-03-02 LAB
GLUCOSE BLDC GLUCOMTR-MCNC: 85
HBA1C MFR BLD HPLC: 6.7
LDLC SERPL DIRECT ASSAY-MCNC: 60

## 2022-03-02 PROCEDURE — 99214 OFFICE O/P EST MOD 30 MIN: CPT | Mod: 25

## 2022-03-02 PROCEDURE — 82962 GLUCOSE BLOOD TEST: CPT

## 2022-03-02 NOTE — HISTORY OF PRESENT ILLNESS
[FreeTextEntry1] : Interval Hx - no issues\par \par Quality: Type 2\par Severity: moderate\par Duration: 2013\par Onset: blood test\par \par ASSOCIATED SYMPTOMS/COMPLICATIONS:\par (+) albuminuria  on ARB\par (+) CAD s/p CABG 3 vessel 2004 s/p nuclear stress test normal\par 1/8/19 eye exam no DR\par \par MODIFYING FACTORS: worsening due to decreased activity\par Exercise: limited due to bad knee, and torn rotator cuff \par \par Current DM meds: no meds before flight\par Glipizide ER 5 mg BID before meals\par MFN  mg 2 tabs BID\par Tradjenta 5 mg daily\par \par SMBG: testing 4x daily, fasting 90-low 100's, usually FS <120. denies hypoglycemia\par ====================================\par HLD  on simvastatin 80 mg nightly, fenofibrate 160 mg daily, ezetimibe 10 mg daily\par ====================================\par \par

## 2022-03-02 NOTE — REVIEW OF SYSTEMS
[Recent Weight Gain (___ Lbs)] : recent weight gain: [unfilled] lbs [Blurred Vision] : no blurred vision [Chest Pain] : no chest pain [Shortness Of Breath] : no shortness of breath [Nausea] : no nausea [Abdominal Pain] : no abdominal pain [Polyuria] : no polyuria [Nocturia] : no nocturia [Pain/Numbness of Digits] : no pain/numbness of digits [Polydipsia] : no polydipsia

## 2022-03-02 NOTE — ASSESSMENT
[FreeTextEntry1] : 1. T2DM comp by CAD s/p CABG and microalbuminuria - improved control after treatment of acute appendicitis, he is asymptomatic. he is a  - does not take DM meds before flight bc he is concerned about lows but eats protein with carb before fight. A1c ok. good control by history\par - cont Tradjenta 5 mg daily\par - cont MFN  mg 2 tabs BID; ok to take this before flight - will not cause hypoglycemia\par - cont Glipizide ER 5 mg BID\par - cont to monitor FS, brad before flight, risks of hyperglycemia/hypoglycemia discussed\par - cont ARB\par - cardio f/u\par - needs dilated eye exam w ophthalmology\par \par 2. HLD - cont vytorin and fibrate\par \par \par \par

## 2022-08-05 ENCOUNTER — RX RENEWAL (OUTPATIENT)
Age: 72
End: 2022-08-05

## 2022-08-06 LAB
ALBUMIN SERPL ELPH-MCNC: 4.5 G/DL
ALP BLD-CCNC: 35 U/L
ALT SERPL-CCNC: 34 U/L
ANION GAP SERPL CALC-SCNC: 10 MMOL/L
AST SERPL-CCNC: 18 U/L
BILIRUB SERPL-MCNC: 0.3 MG/DL
BUN SERPL-MCNC: 24 MG/DL
CALCIUM SERPL-MCNC: 10.1 MG/DL
CHLORIDE SERPL-SCNC: 97 MMOL/L
CHOLEST SERPL-MCNC: 113 MG/DL
CO2 SERPL-SCNC: 25 MMOL/L
CREAT SERPL-MCNC: 1.09 MG/DL
EGFR: 71 ML/MIN/1.73M2
ESTIMATED AVERAGE GLUCOSE: 146 MG/DL
GLUCOSE SERPL-MCNC: 113 MG/DL
HBA1C MFR BLD HPLC: 6.7 %
HDLC SERPL-MCNC: 50 MG/DL
LDLC SERPL CALC-MCNC: 49 MG/DL
NONHDLC SERPL-MCNC: 63 MG/DL
POTASSIUM SERPL-SCNC: 4.7 MMOL/L
PROT SERPL-MCNC: 6.8 G/DL
SODIUM SERPL-SCNC: 131 MMOL/L
TRIGL SERPL-MCNC: 71 MG/DL

## 2022-08-10 ENCOUNTER — RESULT CHARGE (OUTPATIENT)
Age: 72
End: 2022-08-10

## 2022-08-10 ENCOUNTER — APPOINTMENT (OUTPATIENT)
Dept: ENDOCRINOLOGY | Facility: CLINIC | Age: 72
End: 2022-08-10

## 2022-08-10 VITALS
SYSTOLIC BLOOD PRESSURE: 122 MMHG | DIASTOLIC BLOOD PRESSURE: 80 MMHG | HEART RATE: 55 BPM | HEIGHT: 64 IN | BODY MASS INDEX: 28.85 KG/M2 | WEIGHT: 169 LBS

## 2022-08-10 LAB — GLUCOSE BLDC GLUCOMTR-MCNC: 105

## 2022-08-10 PROCEDURE — 99214 OFFICE O/P EST MOD 30 MIN: CPT | Mod: 25

## 2022-08-10 PROCEDURE — 82962 GLUCOSE BLOOD TEST: CPT

## 2022-08-10 NOTE — PHYSICAL EXAM
[Alert] : alert [No Acute Distress] : no acute distress [EOMI] : extra ocular movement intact [No Thyroid Nodules] : no palpable thyroid nodules [No Accessory Muscle Use] : no accessory muscle use [Clear to Auscultation] : lungs were clear to auscultation bilaterally [Normal S1, S2] : normal S1 and S2 [Normal Rate] : heart rate was normal [No Edema] : no peripheral edema [Not Tender] : non-tender [Soft] : abdomen soft [Normal Gait] : normal gait [Oriented x3] : oriented to person, place, and time [Normal Affect] : the affect was normal [Normal Insight/Judgement] : insight and judgment were intact [Normal Mood] : the mood was normal [de-identified] : DIEGO

## 2022-08-10 NOTE — DATA REVIEWED
[FreeTextEntry1] : Labs 9/28/18\par Cr 1.09\par LDl chol 67\par Tg 188\par A1c 7.8%\par TSH 4.720\par urine microalb/Cr 37\par \par Labs 1/24/19\par Cr 1.32, GFR 54\par LDL chol 77, Tg 163\par A1c 6.6%\par TSH 2.780, FT4 1.45\par urine microalb/Cr 31\par \par Labs 6/25/19\par Gluc 133, A1c 7.4\par CR 1.32, GFR 54\par LDL 67, HDL 40, Tg 132\par urine alb/Cr 50\par \par Labs 10/30/19\par Gluc 134, A1c 7.3%\par Cr 1.32, GFR 54\par ALT 57\par LDL 54, HDL 43, Tg 154\par B12 866\par \par labs 11/18/20 \par gluc 191, A1c 7.7\par Cr 1.21. GFR 59\par LDL 84, Tg 180, HDL 40\par TSH 2.520\par urine alb/Cr 109\par \par Labs 2/23/21\par Gluc 212, A1c 7.9\par Cr 1.08, GFR 68\par LDL 51, Tg 180, HDL 39\par TSH 2.82\par B12 679\par urine alb/Cr 115\par \par Labs 5/26/21\par Gluc 143, A1c 8.4\par Cr 1.06, GFR 69\par LDL 59, HDL 38, Tg 154\par \par Labs 9/17/21\par Gluc 107, A1c 6.5\par Cr 0.97, GFR 77\par HDL 39, LDL 53, Tg 94\par TSH 2.68\par Hb 12.5\par B12 734\par urine alb/Cr 274\par \par labs 3/1/22 \par Cr 1.15, GFR 67\par A1c 6.7,gluc 110\par LDL 60, Tg 191\par Na 132

## 2022-08-10 NOTE — REVIEW OF SYSTEMS
[Recent Weight Gain (___ Lbs)] : recent weight gain: [unfilled] lbs [Blurred Vision] : no blurred vision [Chest Pain] : no chest pain [Shortness Of Breath] : no shortness of breath [SOB on Exertion] : no shortness of breath on exertion [Nausea] : no nausea [Abdominal Pain] : no abdominal pain [Polyuria] : no polyuria [Nocturia] : no nocturia [Pain/Numbness of Digits] : no pain/numbness of digits [Polydipsia] : no polydipsia

## 2022-08-10 NOTE — HISTORY OF PRESENT ILLNESS
[FreeTextEntry1] : Interval Hx - no issues, no changes\par \par Quality: Type 2\par Severity: moderate\par Duration: 2013\par Onset: blood test\par \par ASSOCIATED SYMPTOMS/COMPLICATIONS:\par (+) albuminuria  on ARB\par (+) CAD s/p CABG 3 vessel 2004 s/p nuclear stress test normal\par 2022 eye exam no DR\par \par MODIFYING FACTORS: worsening due to decreased activity\par Exercise: limited due to bad knee, and torn rotator cuff \par \par Current DM meds: no Glipizide before flight\par Glipizide ER 5 mg BID before meals\par MFN  mg 2 tabs BID\par Tradjenta 5 mg daily\par \par SMBG: testing 4x daily, usually FS <130. denies hypoglycemia, lowest FS 78\par ================================================================\par HLD  on simvastatin 80 mg nightly, fenofibrate 160 mg daily, ezetimibe 10 mg daily\par ================================================================\par \par

## 2022-08-10 NOTE — ASSESSMENT
[FreeTextEntry1] : 1. T2DM comp by CAD s/p CABG and microalbuminuria - A1c ok. good control by history\par - cont Tradjenta 5 mg daily\par - cont MFN  mg 2 tabs BID\par - cont Glipizide ER 5 mg BID\par - cont to monitor FS, brad before flight, risks of hyperglycemia/hypoglycemia discussed\par - cont ARB\par - cardio f/u\par \par 2. HLD - cont vytorin and fibrate\par \par 3. Hyponatremia -asymptomatic and euvolemic on exam, possible due to stric adherence with low sodium diet,  increase salt intake, f/u PCP. normal TSH in past, check TSH (add on to recent labs), no si/sx adrenal insufficiency\par \par \par \par

## 2022-08-11 LAB — TSH SERPL-ACNC: 2.87 UIU/ML

## 2022-08-18 ENCOUNTER — APPOINTMENT (OUTPATIENT)
Dept: ENDOCRINOLOGY | Facility: CLINIC | Age: 72
End: 2022-08-18

## 2022-08-25 ENCOUNTER — RX RENEWAL (OUTPATIENT)
Age: 72
End: 2022-08-25

## 2022-09-14 ENCOUNTER — APPOINTMENT (OUTPATIENT)
Dept: ORTHOPEDIC SURGERY | Facility: CLINIC | Age: 72
End: 2022-09-14

## 2022-09-14 VITALS — BODY MASS INDEX: 28.85 KG/M2 | WEIGHT: 169 LBS | HEIGHT: 64 IN

## 2022-09-14 PROCEDURE — 73030 X-RAY EXAM OF SHOULDER: CPT | Mod: LT

## 2022-09-14 PROCEDURE — 20611 DRAIN/INJ JOINT/BURSA W/US: CPT | Mod: LT

## 2022-09-14 PROCEDURE — 99204 OFFICE O/P NEW MOD 45 MIN: CPT | Mod: 25

## 2022-09-14 PROCEDURE — 99072 ADDL SUPL MATRL&STAF TM PHE: CPT

## 2022-09-14 NOTE — PHYSICAL EXAM
[de-identified] : Constitutional: The general appearance of the patient is well developed, well nourished, no deformities and well groomed. Normal \par \par Gait: Gait and function is as follows: normal gait. \par \par Skin: Head and neck visualized skin is normal. Left upper extremity visualized skin is normal. Right upper extremity visualized skin is normal. Thoracic Skin of the thoracic spine shows visualized skin is normal. \par \par Cardiovascular: palpable radial pulse bilaterally, good capillary refill in digits of the bilateral upper extremities and no temperature or color changes in the bilateral upper extremities. \par \par Lymphatic: Normal Palpation of lymph nodes in the cervical. \par \par Neurologic: fine motor control in the bilateral upper extremities is intact. Deep Tendon Reflexes in Upper and Lower Extremities Negative Williamson's in the bilateral upper extremities. The patient is oriented to time, place and person. Sensation to light touch intact in the bilateral upper extremities. Mood and Affect is normal. \par \par Right Shoulder:  Inspection of the shoulder/upper arm is as follows: There is a full, pain-free, stable range of motion of the shoulder with normal strength and no tenderness to palpation. \par \par Left Shoulder: Inspection of the shoulder/upper arm is as follows: no scapula winging, no biceps deformity and no AC joint deformity. Palpation of the shoulder/upper arm is as follows: There is tenderness at the proximal biceps tendon. Range of motion of the shoulder is as follows: Pain with internal rotation, external rotation, abduction and forward flexion. Strength of the shoulder is as follows: Supraspinatus 4/5. External Rotation 4/5. Internal Rotation 4/5. Deltoid 5/5 Ligament Stability and Special Tests of the shoulder is as follows: Neer test is positive. Eckert' test is positive. Speed's test is positive. \par \par Neck: \par Inspection / Palpation of the cervical spine is as follows: mild paracervical tenderness. Range of motion of the cervical spine is as follows: moderately decreased range of motion of the cervical spine. Stability testing for the cervical spine is as follows Stable range of motion. \par \par Back, including spine: Inspection / Palpation of the thoracic/lumbar spine is as follows: There is a full, pain free, stable range of motion of the thoracic spine with a normal tone and not tenderness to palpation..\par

## 2022-09-14 NOTE — WORK
[Torn Ligament/Tendon/Muscle] : torn ligament, tendon or muscle [Was the competent medical cause of the injury] : was the competent medical cause of the injury [Are consistent with the injury] : are consistent with the injury [Partial] : partial [Does not reveal pre-existing condition(s) that may affect treatment/prognosis] : does not reveal pre-existing condition(s) that may affect treatment/prognosis

## 2022-09-14 NOTE — DISCUSSION/SUMMARY
[de-identified] : 71 yo male presenting to the office c/o ongoing left shoulder pain as the result of a work related injury on 12/17/05.\par x-rays today demonstrate mild to moderate GHOA\par Patient was treated today with US guided subacromial injection to aid in decrease of pain and inflammation\par Recommended MRI of left shoulder to further evaluate for full thickness rotator cuff tear \par Follow up 1-2 weeks\par Can consider biceps injection if no significant findings on MRI \par \par \par Based on the patient’s history and physical exam findings, I am concerned about the possibility of a full-thickness rotator cuff tear.  The patient has pain and subjective weakness consistent with this diagnosis.  Therefore, I recommend an MRI to evaluate for a rotator cuff tear.  This will also aid in evaluating for injury to the biceps labral complex and for any signs of impingement. The patient will follow-up after MRI to discuss further treatment options.\par (1) We discussed a comprehensive treatment plans that included a prescription management plan involving the use of prescription strength medications to include Ibuprofen 600-800 mg TID, versus 500-650 mg Tylenol. We also discussed prescribing topical diclofenac (Voltaren gel) as well as once daily Meloxicam 15 mg.\par (2) The patient has More Than One chronic injuries/illnesses as outlined, discussed, and documented by ICD 10 codes listed, as well as the HPI and Plan section.\par There is a moderate risk of morbidity with further treatment, especially from use of prescription strength medications and possible side effects of these medications which include upset stomach and cardiac/renal issues with long term use were discussed.\par (3) I recommended that the patient follow-up with their medical physician to discuss any significant specific potential issues with long term use such as interactions with current medications or with exacerbation of underlying medical morbidities. \par \par Attestation:\par I, Elsie Parrish , attest that this documentation has been prepared under the direction and in the presence of Provider Brandan Jensen MD.\par The documentation recorded by the scribe, in my presence, accurately reflects the service I personally performed, and the decisions made by me with my edits as appropriate.\par Brandan Jensen MD\par \par

## 2022-09-14 NOTE — PROCEDURE
[Large Joint Injection] : Large joint injection [Left] : of the left [Subacromial Space] : subacromial space [___ cc    1%] : Lidocaine ~Vcc of 1%  [___ cc    10mg] : Triamcinolone (Kenalog) ~Vcc of 10 mg  [All ultrasound images have been permanently captured and stored accordingly in our picture archiving and communication system] : All ultrasound images have been permanently captured and stored accordingly in our picture archiving and communication system [Visualization of the needle and placement of injection was performed without complication] : visualization of the needle and placement of injection was performed without complication [Pain] : pain [Inflammation] : inflammation [FreeTextEntry3] : Large Joint Injection was performed because of pain and inflammation. \par Anesthesia: ethyl chloride sprayed topically.. \par Depomedrol: An injection of Depomedrol 40 mg , 1 cc. \par Lidocaine: 7 cc. \par \par Medication was injected in the left subacromial space. Patient has tried OTC's including aspirin, Ibuprofen, Aleve etc or prescription NSAIDS, and/or exercises at home and/ or physical therapy without satisfactory response and Patient has decreased mobility in the joint. After verbal consent using sterile preparation and technique. The risks, benefits, and alternatives to cortisone injection were explained in full to the patient. Risks outlined include but are not limited to infection, sepsis, bleeding, scarring, skin discoloration, temporary increase in pain, syncopal episode, failure to resolve symptoms, allergic reaction, symptom recurrence, and elevation of blood sugar in diabetics. Patient understood the risks. All questions were answered. After discussion of options, patient requested an injection. Oral informed consent was obtained and sterile prep was done of the injection site. Sterile technique was utilized for the procedure including the preparation of the solutions used for the injection. Patient tolerated the procedure well. Advised to ice the injection site this evening. Prep with alcohol locally to site. Sterile technique used. Patient tolerated procedure well. Post Procedure Instructions: Patient was advised to call if redness, pain, or fever occur and apply ice for 15 min. out of every hour for the next 12-24 hours as tolerated. patient was advised to rest the joint(s) for 7 days. \par Ultrasound Guidance was used for the following reasons: prior failure or difficult injection. \par \par Ultrasound guided injection was performed of the shoulder, visualization of the needle and placement of injection was performed without complication.\par \par

## 2022-09-14 NOTE — HISTORY OF PRESENT ILLNESS
[de-identified] : 73 yo male presenting to the office c/o ongoing left shoulder pain as the result of a work related injury on 12/17/05. The patient denies any problems before the accident/injury. He was treated for his neck pain as a result from the same injury. Patient states his neck pain is now liveable and his pain in the shoulder has been getting progressively worse x7 years. Pain is described as constant, moderate. Patient reports pain has been getting progressively worse. Pain is in the posterior aspect of his shoulder. Pain is worse at night. \par PMHx: herniated disk

## 2022-09-16 ENCOUNTER — FORM ENCOUNTER (OUTPATIENT)
Age: 72
End: 2022-09-16

## 2022-09-17 ENCOUNTER — APPOINTMENT (OUTPATIENT)
Dept: MRI IMAGING | Facility: CLINIC | Age: 72
End: 2022-09-17
Payer: OTHER MISCELLANEOUS

## 2022-09-17 PROCEDURE — 73221 MRI JOINT UPR EXTREM W/O DYE: CPT | Mod: LT

## 2022-09-17 PROCEDURE — 99072 ADDL SUPL MATRL&STAF TM PHE: CPT

## 2022-09-28 ENCOUNTER — APPOINTMENT (OUTPATIENT)
Dept: ORTHOPEDIC SURGERY | Facility: CLINIC | Age: 72
End: 2022-09-28

## 2022-09-28 VITALS — BODY MASS INDEX: 28.85 KG/M2 | WEIGHT: 169 LBS | HEIGHT: 64 IN

## 2022-09-28 PROCEDURE — 99072 ADDL SUPL MATRL&STAF TM PHE: CPT

## 2022-09-28 PROCEDURE — 20611 DRAIN/INJ JOINT/BURSA W/US: CPT | Mod: LT

## 2022-09-28 PROCEDURE — 99214 OFFICE O/P EST MOD 30 MIN: CPT | Mod: 25

## 2022-09-28 NOTE — PHYSICAL EXAM
[de-identified] : Constitutional: The general appearance of the patient is well developed, well nourished, no deformities and well groomed. Normal \par \par Gait: Gait and function is as follows: normal gait. \par \par Skin: Head and neck visualized skin is normal. Left upper extremity visualized skin is normal. Right upper extremity visualized skin is normal. Thoracic Skin of the thoracic spine shows visualized skin is normal. \par \par Cardiovascular: palpable radial pulse bilaterally, good capillary refill in digits of the bilateral upper extremities and no temperature or color changes in the bilateral upper extremities. \par \par Lymphatic: Normal Palpation of lymph nodes in the cervical. \par \par Neurologic: fine motor control in the bilateral upper extremities is intact. Deep Tendon Reflexes in Upper and Lower Extremities Negative Williamson's in the bilateral upper extremities. The patient is oriented to time, place and person. Sensation to light touch intact in the bilateral upper extremities. Mood and Affect is normal. \par \par Right Shoulder:  Inspection of the shoulder/upper arm is as follows: There is a full, pain-free, stable range of motion of the shoulder with normal strength and no tenderness to palpation. \par \par Left Shoulder: Inspection of the shoulder/upper arm is as follows: no scapula winging, no biceps deformity and no AC joint deformity. Palpation of the shoulder/upper arm is as follows: There is tenderness at the proximal biceps tendon. Range of motion of the shoulder is as follows: Pain with internal rotation, external rotation, abduction and forward flexion. Strength of the shoulder is as follows: Supraspinatus 4/5. External Rotation 4/5. Internal Rotation 4/5. Deltoid 5/5 Ligament Stability and Special Tests of the shoulder is as follows: Neer test is positive. Eckert' test is positive. Speed's test is positive. \par \par Neck: \par Inspection / Palpation of the cervical spine is as follows: mild paracervical tenderness. Range of motion of the cervical spine is as follows: moderately decreased range of motion of the cervical spine. Stability testing for the cervical spine is as follows Stable range of motion. \par \par Back, including spine: Inspection / Palpation of the thoracic/lumbar spine is as follows: There is a full, pain free, stable range of motion of the thoracic spine with a normal tone and not tenderness to palpation..\par

## 2022-09-28 NOTE — DISCUSSION/SUMMARY
[de-identified] : 71 yo male presenting to the office c/o ongoing left shoulder pain as the result of a work related injury on 12/17/05.\par x-rays demonstrate mild to moderate GHOA\par Previous US guided subacromial injection provided mild relief for the patient \par MRI of left shoulder demonstrates large retracted tears of the supraspinatus and infraspinatus tendons; severe supraspinatus and infraspinatus muscle atrophy\par Discussed with the patient ultimately he is a candidate for left reverse TSA \par Patient was treated today with US guided glenohumeral injection to aid in decrease of pain and inflammation \par He wishes to submit for approval for surgery through  \par Follow up 4-6 weeks \par I believe the patients initial 2005 injury which resulted in a rotator cuff tear has progressed to the point where it is now irreparable. I believe it is causally related to the initial injury.\par \par Pt has a significant osteoarthritis, with an intact deltoid, rotator cuff insufficiency, and inflammation to the biceps labral complex with proximal biceps tendinopathy. This has been present for more than 6 months and has not improved despite more than 3 months of conservative treatment including focused HEP/therapy, anti-inflammatory medication and activity modification. There have been no injections into the shoulder within 3 months of the indicated procedure.\par The patient is having severe pain with forward flexion and/or pseudoparalysis.\par The patient is interested in pursuing surgical treatment.\par We had a lengthy discussion about the surgery as well as the recovery. We discussed the risks which include but are not limited to infection, bleeding, need for blood transfusions, failure of treatment to alleviate all pain or improve function, the risk of injury to nerves and blood vessels. There is also the risks inherent to anesthesia as well. We discussed that given the patient’s distorted anatomy as a result of arthritis, these risks are real although every attempt will be made to mitigate these at the time of surgery.\par Pt understands there is no guarantee of success, however there is a high likelihood of functional improvement and pain relief. The patient understood all this was discussed.\par \par The patient is indicated for a Left reverse shoulder arthroplasty with biceps tenodesis.\par \par * Surgery: Considering patient has failed all conservative treatment we are requesting authorization for:\par Left shoulder Reverse Shoulder Arthroplasty (CPT 42545) with Biceps Tenodesis (CPT 41333)\par Pt would like surgery (***)\par The patient will require a [place] Arc 2.0 brace, cryotherapy, and 12 weeks of post-operative physical therapy.\par CT scan with Tino Protocol\par The patient will require a medical clearance, cardiology clearance\par The doctor will require the [employer] representative, 2 hours, and one assistant.\par \par (1) We discussed a comprehensive treatment plans that included a prescription management plan involving the use of prescription strength medications to include Ibuprofen 600-800 mg TID, versus 500-650 mg Tylenol. We also discussed prescribing topical diclofenac (Voltaren gel) as well as once daily Meloxicam 15 mg.\par (2) The patient has More Than One chronic injuries/illnesses as outlined, discussed, and documented by ICD 10 codes listed, as well as the HPI and Plan section.\par There is a moderate risk of morbidity with further treatment, especially from use of prescription strength medications and possible side effects of these medications which include upset stomach and cardiac/renal issues with long term use were discussed.\par (3) I recommended that the patient follow-up with their medical physician to discuss any significant specific potential issues with long term use such as interactions with current medications or with exacerbation of underlying medical morbidities. \par \par Attestation:\par I, Elsie Parrish , attest that this documentation has been prepared under the direction and in the presence of Provider Brandan Jensen MD.\par The documentation recorded by the scribe, in my presence, accurately reflects the service I personally performed, and the decisions made by me with my edits as appropriate.\par Brandan Jensen MD\par \par \par

## 2022-09-28 NOTE — HISTORY OF PRESENT ILLNESS
[de-identified] : 73 yo male presenting to the office c/o ongoing left shoulder pain as the result of a work related injury on 12/17/05. The patient denies any problems before the accident/injury. He was treated for his neck pain as a result from the same injury. Patient states his neck pain is now liveable and his pain in the shoulder has been getting progressively worse x7 years. Pain is described as constant, moderate. Patient reports pain has been getting progressively worse. Pain is in the posterior aspect of his shoulder. Pain is worse at night. \par PMHx: herniated disk\par \par 9/28/22: Patient presents for repeat evaluation of left shoulder pain. He admits to mild relief from previous subacromial injection. He continues to have significant pain with ADLs. He presents to review MRI.

## 2022-09-28 NOTE — PROCEDURE
[Large Joint Injection] : Large joint injection [Left] : of the left [Ethyl Chloride sprayed topically] : ethyl chloride sprayed topically [Sterile technique used] : sterile technique used [___ cc    1%] : Lidocaine ~Vcc of 1%  [___ cc    10mg] : Triamcinolone (Kenalog) ~Vcc of 10 mg  [All ultrasound images have been permanently captured and stored accordingly in our picture archiving and communication system] : All ultrasound images have been permanently captured and stored accordingly in our picture archiving and communication system [Visualization of the needle and placement of injection was performed without complication] : visualization of the needle and placement of injection was performed without complication [Pain] : pain [Inflammation] : inflammation [Glenohumeral Joint] : glenohumeral joint [Glenohmeral injection] : glenohumeral injection [FreeTextEntry3] : Large Joint Injection was performed because of pain and inflammation. \par Anesthesia: ethyl chloride sprayed topically.. \par Depomedrol: An injection of Depomedrol 40 mg , 1 cc. \par Lidocaine: 3 cc. \par \par Medication was injected in the left glenohumeral. Patient has tried OTC's including aspirin, Ibuprofen, Aleve etc or prescription NSAIDS, and/or exercises at home and/ or physical therapy without satisfactory response and Patient has decreased mobility in the joint. After verbal consent using sterile preparation and technique. The risks, benefits, and alternatives to cortisone injection were explained in full to the patient. Risks outlined include but are not limited to infection, sepsis, bleeding, scarring, skin discoloration, temporary increase in pain, syncopal episode, failure to resolve symptoms, allergic reaction, symptom recurrence, and elevation of blood sugar in diabetics. Patient understood the risks. All questions were answered. After discussion of options, patient requested an injection. Oral informed consent was obtained and sterile prep was done of the injection site. Sterile technique was utilized for the procedure including the preparation of the solutions used for the injection. Patient tolerated the procedure well. Advised to ice the injection site this evening. Prep with alcohol locally to site. Sterile technique used. Patient tolerated procedure well. Post Procedure Instructions: Patient was advised to call if redness, pain, or fever occur and apply ice for 15 min. out of every hour for the next 12-24 hours as tolerated. patient was advised to rest the joint(s) for 7 days. \par Ultrasound Guidance was used for the following reasons: prior failure or difficult injection. \par \par Ultrasound guided injection was performed of the shoulder, visualization of the needle and placement of injection was performed without complication.\par

## 2022-09-28 NOTE — ASSESSMENT
[FreeTextEntry1] : MRI Left SH [facility] 9/17/22: 1. Large retracted tears of the supraspinatus and infraspinatus tendons with severe supraspinatus and infraspinatus muscle atrophy and severe superior displacement of the humeral head with narrowing of the supraspinatus outlet.\par 2. Partial tearing of the subscapularis tendon insertion.\par 3. Disruption and retraction of the biceps tendon.\par 4. Moderate glenohumeral joint arthrosis with circumferential labral tearing.\par 5. Large effusion, severe bursitis and AC joint arthrosis with lateral acromial bone spurs.\par 6. No acute fracture.\par 7. Mild subscapularis muscle atrophy.

## 2022-10-25 ENCOUNTER — RX RENEWAL (OUTPATIENT)
Age: 72
End: 2022-10-25

## 2022-10-25 ENCOUNTER — FORM ENCOUNTER (OUTPATIENT)
Age: 72
End: 2022-10-25

## 2022-10-31 RX ORDER — BLOOD-GLUCOSE METER
W/DEVICE EACH MISCELLANEOUS
Qty: 1 | Refills: 0 | Status: ACTIVE | COMMUNITY
Start: 2021-09-22 | End: 1900-01-01

## 2022-10-31 RX ORDER — BLOOD SUGAR DIAGNOSTIC
STRIP MISCELLANEOUS
Qty: 400 | Refills: 3 | Status: ACTIVE | COMMUNITY
Start: 2018-10-10 | End: 1900-01-01

## 2022-11-09 ENCOUNTER — APPOINTMENT (OUTPATIENT)
Dept: ORTHOPEDIC SURGERY | Facility: CLINIC | Age: 72
End: 2022-11-09

## 2022-11-09 DIAGNOSIS — S46.012S STRAIN OF MUSCLE(S) AND TENDON(S) OF THE ROTATOR CUFF OF LEFT SHOULDER, SEQUELA: ICD-10-CM

## 2022-11-09 PROCEDURE — 99214 OFFICE O/P EST MOD 30 MIN: CPT

## 2022-11-09 PROCEDURE — 99072 ADDL SUPL MATRL&STAF TM PHE: CPT

## 2022-11-09 NOTE — PHYSICAL EXAM
[de-identified] : Constitutional: The general appearance of the patient is well developed, well nourished, no deformities and well groomed. Normal \par \par Gait: Gait and function is as follows: normal gait. \par \par Skin: Head and neck visualized skin is normal. Left upper extremity visualized skin is normal. Right upper extremity visualized skin is normal. Thoracic Skin of the thoracic spine shows visualized skin is normal. \par \par Cardiovascular: palpable radial pulse bilaterally, good capillary refill in digits of the bilateral upper extremities and no temperature or color changes in the bilateral upper extremities. \par \par Lymphatic: Normal Palpation of lymph nodes in the cervical. \par \par Neurologic: fine motor control in the bilateral upper extremities is intact. Deep Tendon Reflexes in Upper and Lower Extremities Negative Williamson's in the bilateral upper extremities. The patient is oriented to time, place and person. Sensation to light touch intact in the bilateral upper extremities. Mood and Affect is normal. \par \par Right Shoulder:  Inspection of the shoulder/upper arm is as follows: There is a full, pain-free, stable range of motion of the shoulder with normal strength and no tenderness to palpation. \par \par Left Shoulder: Inspection of the shoulder/upper arm is as follows: no scapula winging, no biceps deformity and no AC joint deformity. Palpation of the shoulder/upper arm is as follows: There is tenderness at the proximal biceps tendon. Range of motion of the shoulder is as follows: Pain with internal rotation, external rotation, abduction and forward flexion. Strength of the shoulder is as follows: Supraspinatus 4/5. External Rotation 4/5. Internal Rotation 4/5. Deltoid 5/5 Ligament Stability and Special Tests of the shoulder is as follows: Neer test is positive. Eckert' test is positive. Speed's test is positive. \par \par Neck: \par Inspection / Palpation of the cervical spine is as follows: mild paracervical tenderness. Range of motion of the cervical spine is as follows: moderately decreased range of motion of the cervical spine. Stability testing for the cervical spine is as follows Stable range of motion. \par \par Back, including spine: Inspection / Palpation of the thoracic/lumbar spine is as follows: There is a full, pain free, stable range of motion of the thoracic spine with a normal tone and not tenderness to palpation..\par

## 2022-11-09 NOTE — DISCUSSION/SUMMARY
[de-identified] : 71 yo male presenting to the office c/o ongoing left shoulder pain as the result of a work related injury on 12/17/05.\par x-rays demonstrate mild to moderate GHOA\par Previous US guided subacromial and glenohumeral injection provided significant relief for the patient \par MRI of left shoulder demonstrates large retracted tears of the supraspinatus and infraspinatus tendons; severe supraspinatus and infraspinatus muscle atrophy\par Discussed with the patient ultimately he is a candidate for left reverse TSA \par Patient was recently approved for surgery\par he wishes to proceed with conservative treatment at this time due to significant relief in pain following cortisone injections and rest \par Patient will follow up 2-3 months for reevaluation \par \par Patient understands that steroid injection may be temporary solution and he may ultimately require Reverse TSA.\par - will follow up in 2-3 months\par \par Attestation:\par I, Elsie Parrish , attest that this documentation has been prepared under the direction and in the presence of Provider Brandan Jensen MD.\par The documentation recorded by the scribe, in my presence, accurately reflects the service I personally performed, and the decisions made by me with my edits as appropriate.\par Brandan Jensen MD\par \par \par

## 2022-11-09 NOTE — HISTORY OF PRESENT ILLNESS
[de-identified] : 73 yo male presenting to the office c/o ongoing left shoulder pain as the result of a work related injury on 12/17/05. The patient denies any problems before the accident/injury. He was treated for his neck pain as a result from the same injury. Patient states his neck pain is now liveable and his pain in the shoulder has been getting progressively worse x7 years. Pain is described as constant, moderate. Patient reports pain has been getting progressively worse. Pain is in the posterior aspect of his shoulder. Pain is worse at night. \par PMHx: herniated disk\par \par 9/28/22: Patient presents for repeat evaluation of left shoulder pain. He admits to mild relief from previous subacromial injection. He continues to have significant pain with ADLs. He presents to review MRI. \par 11/9/22: Patient presents for repeat evaluation of left shoulder pain. He admits significant relief following previous subacromial and biceps injection. He was recently approved for reverse TSA by [employer].

## 2022-11-23 ENCOUNTER — NON-APPOINTMENT (OUTPATIENT)
Age: 72
End: 2022-11-23

## 2022-11-23 LAB
ALBUMIN SERPL ELPH-MCNC: 4.8 G/DL
ALP BLD-CCNC: 42 U/L
ALT SERPL-CCNC: 34 U/L
ANION GAP SERPL CALC-SCNC: 14 MMOL/L
AST SERPL-CCNC: 20 U/L
BILIRUB SERPL-MCNC: 0.6 MG/DL
BUN SERPL-MCNC: 17 MG/DL
CALCIUM SERPL-MCNC: 10.3 MG/DL
CHLORIDE SERPL-SCNC: 91 MMOL/L
CHOLEST SERPL-MCNC: 130 MG/DL
CO2 SERPL-SCNC: 23 MMOL/L
CREAT SERPL-MCNC: 0.89 MG/DL
EGFR: 90 ML/MIN/1.73M2
ESTIMATED AVERAGE GLUCOSE: 160 MG/DL
GLUCOSE SERPL-MCNC: 142 MG/DL
HBA1C MFR BLD HPLC: 7.2 %
HDLC SERPL-MCNC: 52 MG/DL
LDLC SERPL CALC-MCNC: 56 MG/DL
NONHDLC SERPL-MCNC: 78 MG/DL
POTASSIUM SERPL-SCNC: 4.5 MMOL/L
PROT SERPL-MCNC: 7.3 G/DL
SODIUM SERPL-SCNC: 128 MMOL/L
TRIGL SERPL-MCNC: 112 MG/DL

## 2022-11-30 ENCOUNTER — RESULT CHARGE (OUTPATIENT)
Age: 72
End: 2022-11-30

## 2022-11-30 ENCOUNTER — APPOINTMENT (OUTPATIENT)
Dept: ENDOCRINOLOGY | Facility: CLINIC | Age: 72
End: 2022-11-30

## 2022-11-30 VITALS
SYSTOLIC BLOOD PRESSURE: 128 MMHG | BODY MASS INDEX: 28.68 KG/M2 | WEIGHT: 168 LBS | OXYGEN SATURATION: 98 % | HEART RATE: 66 BPM | HEIGHT: 64 IN | DIASTOLIC BLOOD PRESSURE: 74 MMHG

## 2022-11-30 DIAGNOSIS — E87.1 HYPO-OSMOLALITY AND HYPONATREMIA: ICD-10-CM

## 2022-11-30 LAB — GLUCOSE BLDC GLUCOMTR-MCNC: 131

## 2022-11-30 PROCEDURE — 99214 OFFICE O/P EST MOD 30 MIN: CPT | Mod: 25

## 2022-11-30 PROCEDURE — 82962 GLUCOSE BLOOD TEST: CPT

## 2022-11-30 NOTE — ASSESSMENT
[FreeTextEntry1] : 1. T2DM comp by CAD s/p CABG and microalbuminuria - A1c slightly worse\par - cont Tradjenta 5 mg daily\par - cont MFN  mg 2 tabs BID\par - cont Glipizide ER 5 mg BID\par - cont to monitor FS, brad before flight, risks of hyperglycemia/hypoglycemia discussed\par - cont ARB\par - cardio f/u\par - adhere with diabetic diet\par \par 2. HLD - cont vytorin and fibrate\par \par 3. Hyponatremia -asymptomatic and euvolemic on exam, normal TSH, no si/sx adrenal insufficiency.  ?primary polydipsia or medication induced (on HCTZ by PCP). risks of hyponatremia discussed, declined ER eval, he will see his PCP later today for evaluation \par \par

## 2022-11-30 NOTE — HISTORY OF PRESENT ILLNESS
[FreeTextEntry1] : Interval Hx - did not go to ER for low sodium. he feels well; he has appt with PCP,  Dr Mayen, today\par \par Quality: Type 2\par Severity: moderate\par Duration: 2013\par Onset: blood test\par \par ASSOCIATED SYMPTOMS/COMPLICATIONS:\par (+) albuminuria  on ARB\par (+) CAD s/p CABG 3 vessel 2004 s/p nuclear stress test normal\par 2022 eye exam no DR\par \par MODIFYING FACTORS: worsening due to decreased activity\par Exercise: limited due to bad knee, and torn rotator cuff \par \par Current DM meds: no Glipizide before flight\par Glipizide ER 5 mg BID before meals\par MFN  mg 2 tabs BID\par Tradjenta 5 mg daily\par \par SMBG: testing 4x daily, usually FS <130. denies hypoglycemia\par ================================================================\par HLD  on simvastatin 80 mg nightly, fenofibrate 160 mg daily, ezetimibe 10 mg daily\par ================================================================\par \par

## 2022-11-30 NOTE — PHYSICAL EXAM
[Alert] : alert [No Acute Distress] : no acute distress [EOMI] : extra ocular movement intact [No Thyroid Nodules] : no palpable thyroid nodules [No Accessory Muscle Use] : no accessory muscle use [Clear to Auscultation] : lungs were clear to auscultation bilaterally [Normal S1, S2] : normal S1 and S2 [Normal Rate] : heart rate was normal [No Edema] : no peripheral edema [Not Tender] : non-tender [Soft] : abdomen soft [Normal Gait] : normal gait [Oriented x3] : oriented to person, place, and time [Normal Affect] : the affect was normal [Normal Insight/Judgement] : insight and judgment were intact [Normal Mood] : the mood was normal [Foot Ulcers] : no foot ulcers [2+] : 2+ in the dorsalis pedis [Vibration Dec.] : normal vibratory sensation at the level of the toes [Diminished Throughout Both Feet] : normal tactile sensation with monofilament testing throughout both feet [de-identified] : DIEGO

## 2022-12-07 ENCOUNTER — NON-APPOINTMENT (OUTPATIENT)
Age: 72
End: 2022-12-07

## 2023-01-20 ENCOUNTER — RX RENEWAL (OUTPATIENT)
Age: 73
End: 2023-01-20

## 2023-02-15 ENCOUNTER — APPOINTMENT (OUTPATIENT)
Dept: ORTHOPEDIC SURGERY | Facility: CLINIC | Age: 73
End: 2023-02-15
Payer: OTHER MISCELLANEOUS

## 2023-02-15 PROCEDURE — 20611 DRAIN/INJ JOINT/BURSA W/US: CPT | Mod: LT

## 2023-02-15 PROCEDURE — 99072 ADDL SUPL MATRL&STAF TM PHE: CPT

## 2023-02-15 PROCEDURE — 99214 OFFICE O/P EST MOD 30 MIN: CPT | Mod: 25

## 2023-02-15 NOTE — PHYSICAL EXAM
[de-identified] : Constitutional: The general appearance of the patient is well developed, well nourished, no deformities and well groomed. Normal \par \par Gait: Gait and function is as follows: normal gait. \par \par Skin: Head and neck visualized skin is normal. Left upper extremity visualized skin is normal. Right upper extremity visualized skin is normal. Thoracic Skin of the thoracic spine shows visualized skin is normal. \par \par Cardiovascular: palpable radial pulse bilaterally, good capillary refill in digits of the bilateral upper extremities and no temperature or color changes in the bilateral upper extremities. \par \par Lymphatic: Normal Palpation of lymph nodes in the cervical. \par \par Neurologic: fine motor control in the bilateral upper extremities is intact. Deep Tendon Reflexes in Upper and Lower Extremities Negative Williamson's in the bilateral upper extremities. The patient is oriented to time, place and person. Sensation to light touch intact in the bilateral upper extremities. Mood and Affect is normal. \par \par Right Shoulder:  Inspection of the shoulder/upper arm is as follows: There is a full, pain-free, stable range of motion of the shoulder with normal strength and no tenderness to palpation. \par \par Left Shoulder: Inspection of the shoulder/upper arm is as follows: no scapula winging, no biceps deformity and no AC joint deformity. Palpation of the shoulder/upper arm is as follows: There is tenderness at the proximal biceps tendon. Range of motion of the shoulder is as follows: Pain with internal rotation, external rotation, abduction and forward flexion. Strength of the shoulder is as follows: Supraspinatus 4/5. External Rotation 4/5. Internal Rotation 4/5. Deltoid 5/5 Ligament Stability and Special Tests of the shoulder is as follows: Neer test is positive. Eckert' test is positive. Speed's test is positive. \par \par Neck: \par Inspection / Palpation of the cervical spine is as follows: mild paracervical tenderness. Range of motion of the cervical spine is as follows: moderately decreased range of motion of the cervical spine. Stability testing for the cervical spine is as follows Stable range of motion. \par \par Back, including spine: Inspection / Palpation of the thoracic/lumbar spine is as follows: There is a full, pain free, stable range of motion of the thoracic spine with a normal tone and not tenderness to palpation..\par

## 2023-02-15 NOTE — HISTORY OF PRESENT ILLNESS
[de-identified] : 73 y/o male presenting to the office c/o ongoing left shoulder pain as the result of a work related injury on 12/17/05. The patient denies any problems before the accident/injury. He was treated for his neck pain as a result from the same injury. Patient states his neck pain is now livable and his pain in the shoulder has been getting progressively worse x7 years. Pain is described as constant, moderate. Patient reports pain has been getting progressively worse. Pain is in the posterior aspect of his shoulder. Pain is worse at night. \par PMHx: herniated disk\par \par 9/28/22: Patient presents for repeat evaluation of left shoulder pain. He admits to mild relief from previous subacromial injection. He continues to have significant pain with ADLs. He presents to review MRI. \par 11/9/22: Patient presents for repeat evaluation of left shoulder pain. He admits significant relief following previous subacromial and biceps injection. He was recently approved for reverse TSA by [employer]. \par 2/15/23: Patient returns today for repeat evaluation of left shoulder pain. He has notice worsening lateral shoulder pain over the last several weeks. Previous injection provided significant but transient relief.

## 2023-02-15 NOTE — PROCEDURE
[Large Joint Injection] : Large joint injection [Left] : of the left [Subacromial Space] : subacromial space [FreeTextEntry3] : Large Joint Injection was performed because of pain and inflammation. \par Anesthesia: ethyl chloride sprayed topically.. \par Depomedrol: An injection of Depomedrol 40 mg , 1 cc. \par Lidocaine: 7 cc. \par \par Medication was injected in the left subacromial space. Patient has tried OTC's including aspirin, Ibuprofen, Aleve etc or prescription NSAIDS, and/or exercises at home and/ or physical therapy without satisfactory response and Patient has decreased mobility in the joint. After verbal consent using sterile preparation and technique. The risks, benefits, and alternatives to cortisone injection were explained in full to the patient. Risks outlined include but are not limited to infection, sepsis, bleeding, scarring, skin discoloration, temporary increase in pain, syncopal episode, failure to resolve symptoms, allergic reaction, symptom recurrence, and elevation of blood sugar in diabetics. Patient understood the risks. All questions were answered. After discussion of options, patient requested an injection. Oral informed consent was obtained and sterile prep was done of the injection site. Sterile technique was utilized for the procedure including the preparation of the solutions used for the injection. Patient tolerated the procedure well. Advised to ice the injection site this evening. Prep with alcohol locally to site. Sterile technique used. Patient tolerated procedure well. Post Procedure Instructions: Patient was advised to call if redness, pain, or fever occur and apply ice for 15 min. out of every hour for the next 12-24 hours as tolerated. patient was advised to rest the joint(s) for 7 days. \par Ultrasound Guidance was used for the following reasons: prior failure or difficult injection. \par \par Ultrasound guided injection was performed of the shoulder, visualization of the needle and placement of injection was performed without complication. \par \par

## 2023-02-15 NOTE — DISCUSSION/SUMMARY
[de-identified] : 71 y/o male presenting to the office c/o ongoing left shoulder pain as the result of a work related injury on 12/17/05.\par x-rays demonstrate mild to moderate GHOA\par Previous US guided subacromial and glenohumeral injection provided significant relief for the patient \par MRI of left shoulder demonstrates large retracted tears of the supraspinatus and infraspinatus tendons; severe supraspinatus and infraspinatus muscle atrophy\par Discussed with the patient ultimately he is a candidate for left reverse TSA \par Patient was recently approved for surgery\par he wishes to proceed with conservative treatment at this time \par He was treated today with repeat injection \par Patient will follow up 2-3 months for reevaluation \par \par Patient understands that steroid injection may be temporary solution and he may ultimately require Reverse TSA.\par - will follow up in 2-3 months\par \par (1) We discussed a comprehensive treatment plans that included a prescription management plan involving the use of prescription strength medications to include Ibuprofen 600-800 mg TID, versus 500-650 mg Tylenol. We also discussed prescribing topical diclofenac (Voltaren gel) as well as once daily Meloxicam 15 mg.\par \par (2) The patient has More Than One chronic injuries/illnesses as outlined, discussed, and documented by ICD 10 codes listed, as well as the HPI and Plan section.\par There is a moderate risk of morbidity with further treatment, especially from use of prescription strength medications and possible side effects of these medications which include upset stomach and cardiac/renal issues with long term use were discussed.\par \par (3) I recommended that the patient follow-up with their medical physician to discuss any significant specific potential issues with long term use such as interactions with current medications or with exacerbation of underlying medical morbidities.\par \par \par Patient seen by Nico Londono PA-C under the direct supervision of Brandan Jensen M.D.\par \par \par \par \par

## 2023-02-28 LAB
ALBUMIN SERPL ELPH-MCNC: 4.9 G/DL
ALP BLD-CCNC: 44 U/L
ALT SERPL-CCNC: 26 U/L
ANION GAP SERPL CALC-SCNC: 11 MMOL/L
AST SERPL-CCNC: 17 U/L
BASOPHILS # BLD AUTO: 0.1 K/UL
BASOPHILS NFR BLD AUTO: 1.1 %
BILIRUB SERPL-MCNC: 0.6 MG/DL
BUN SERPL-MCNC: 22 MG/DL
CALCIUM SERPL-MCNC: 10.7 MG/DL
CHLORIDE SERPL-SCNC: 103 MMOL/L
CHOLEST SERPL-MCNC: 142 MG/DL
CO2 SERPL-SCNC: 26 MMOL/L
CREAT SERPL-MCNC: 1.12 MG/DL
CREAT SPEC-SCNC: 52 MG/DL
EGFR: 69 ML/MIN/1.73M2
EOSINOPHIL # BLD AUTO: 0.55 K/UL
EOSINOPHIL NFR BLD AUTO: 5.9 %
ESTIMATED AVERAGE GLUCOSE: 157 MG/DL
GLUCOSE SERPL-MCNC: 96 MG/DL
HBA1C MFR BLD HPLC: 7.1 %
HCT VFR BLD CALC: 44.6 %
HDLC SERPL-MCNC: 50 MG/DL
HGB BLD-MCNC: 14.1 G/DL
IMM GRANULOCYTES NFR BLD AUTO: 0.9 %
LDLC SERPL CALC-MCNC: 68 MG/DL
LYMPHOCYTES # BLD AUTO: 2.18 K/UL
LYMPHOCYTES NFR BLD AUTO: 23.2 %
MAN DIFF?: NORMAL
MCHC RBC-ENTMCNC: 27.3 PG
MCHC RBC-ENTMCNC: 31.6 GM/DL
MCV RBC AUTO: 86.3 FL
MICROALBUMIN 24H UR DL<=1MG/L-MCNC: 15.7 MG/DL
MICROALBUMIN/CREAT 24H UR-RTO: 303 MG/G
MONOCYTES # BLD AUTO: 0.83 K/UL
MONOCYTES NFR BLD AUTO: 8.8 %
NEUTROPHILS # BLD AUTO: 5.65 K/UL
NEUTROPHILS NFR BLD AUTO: 60.1 %
NONHDLC SERPL-MCNC: 92 MG/DL
PLATELET # BLD AUTO: 307 K/UL
POTASSIUM SERPL-SCNC: 4.6 MMOL/L
PROT SERPL-MCNC: 7.4 G/DL
RBC # BLD: 5.17 M/UL
RBC # FLD: 13.2 %
SODIUM SERPL-SCNC: 140 MMOL/L
TRIGL SERPL-MCNC: 119 MG/DL
TSH SERPL-ACNC: 2.87 UIU/ML
VIT B12 SERPL-MCNC: 891 PG/ML
WBC # FLD AUTO: 9.39 K/UL

## 2023-03-29 ENCOUNTER — APPOINTMENT (OUTPATIENT)
Dept: ORTHOPEDIC SURGERY | Facility: CLINIC | Age: 73
End: 2023-03-29
Payer: OTHER MISCELLANEOUS

## 2023-03-29 DIAGNOSIS — M25.512 PAIN IN LEFT SHOULDER: ICD-10-CM

## 2023-03-29 DIAGNOSIS — M19.012 PRIMARY OSTEOARTHRITIS, LEFT SHOULDER: ICD-10-CM

## 2023-03-29 DIAGNOSIS — M75.122 COMPLETE ROTATOR CUFF TEAR OR RUPTURE OF LEFT SHOULDER, NOT SPECIFIED AS TRAUMATIC: ICD-10-CM

## 2023-03-29 DIAGNOSIS — M12.812 UNSPECIFIED ROTATOR CUFF TEAR OR RUPTURE OF LEFT SHOULDER, NOT SPECIFIED AS TRAUMATIC: ICD-10-CM

## 2023-03-29 DIAGNOSIS — M75.102 UNSPECIFIED ROTATOR CUFF TEAR OR RUPTURE OF LEFT SHOULDER, NOT SPECIFIED AS TRAUMATIC: ICD-10-CM

## 2023-03-29 PROCEDURE — 99214 OFFICE O/P EST MOD 30 MIN: CPT

## 2023-03-29 NOTE — PHYSICAL EXAM
[de-identified] : Constitutional: The general appearance of the patient is well developed, well nourished, no deformities and well groomed. Normal \par \par Gait: Gait and function is as follows: normal gait. \par \par Skin: Head and neck visualized skin is normal. Left upper extremity visualized skin is normal. Right upper extremity visualized skin is normal. Thoracic Skin of the thoracic spine shows visualized skin is normal. \par \par Cardiovascular: palpable radial pulse bilaterally, good capillary refill in digits of the bilateral upper extremities and no temperature or color changes in the bilateral upper extremities. \par \par Lymphatic: Normal Palpation of lymph nodes in the cervical. \par \par Neurologic: fine motor control in the bilateral upper extremities is intact. Deep Tendon Reflexes in Upper and Lower Extremities Negative Williamson's in the bilateral upper extremities. The patient is oriented to time, place and person. Sensation to light touch intact in the bilateral upper extremities. Mood and Affect is normal. \par \par Right Shoulder:  Inspection of the shoulder/upper arm is as follows: There is a full, pain-free, stable range of motion of the shoulder with normal strength and no tenderness to palpation. \par \par Left Shoulder: Inspection of the shoulder/upper arm is as follows: no scapula winging, no biceps deformity and no AC joint deformity. Palpation of the shoulder/upper arm is as follows: There is tenderness at the proximal biceps tendon. Range of motion of the shoulder is as follows: Pain with internal rotation, external rotation, abduction and forward flexion. Strength of the shoulder is as follows: Supraspinatus 4/5. External Rotation 4/5. Internal Rotation 4/5. Deltoid 5/5 Ligament Stability and Special Tests of the shoulder is as follows: Neer test is positive. Eckert' test is positive. Speed's test is positive. \par \par Neck: \par Inspection / Palpation of the cervical spine is as follows: mild paracervical tenderness. Range of motion of the cervical spine is as follows: moderately decreased range of motion of the cervical spine. Stability testing for the cervical spine is as follows Stable range of motion. \par \par Back, including spine: Inspection / Palpation of the thoracic/lumbar spine is as follows: There is a full, pain free, stable range of motion of the thoracic spine with a normal tone and not tenderness to palpation..\par

## 2023-03-29 NOTE — HISTORY OF PRESENT ILLNESS
[de-identified] : 71 y/o male presenting to the office c/o ongoing left shoulder pain as the result of a work related injury on 12/17/05. The patient denies any problems before the accident/injury.\par Pt reports there have been no other injuries to the left shoulder since his injury 12/17/2005\par  He was treated for his neck pain as a result from the same injury. Patient states his neck pain is now livable and his pain in the shoulder has been getting progressively worse x7 years. Pain is described as constant, moderate. Patient reports pain has been getting progressively worse. Pain is in the posterior aspect of his shoulder. Pain is worse at night. \par PMHx: herniated disk\par \par 9/28/22: Patient presents for repeat evaluation of left shoulder pain. He admits to mild relief from previous subacromial injection. He continues to have significant pain with ADLs. He presents to review MRI. \par 11/9/22: Patient presents for repeat evaluation of left shoulder pain. He admits significant relief following previous subacromial and biceps injection. He was recently approved for reverse TSA by . \par 2/15/23: Patient returns today for repeat evaluation of left shoulder pain. He has notice worsening lateral shoulder pain over the last several weeks. Previous injection provided significant but transient relief. \par 3/29/23 71 y/o M with left shoulder pain due to a work related injury on 12/17/05. Pt had significant relief from previous cortisone inj.

## 2023-03-29 NOTE — DISCUSSION/SUMMARY
[de-identified] : 71 y/o male presenting to the office c/o ongoing left shoulder pain as the result of a work related injury on 12/17/05.\par There have been no injuries to the left shoulder since 12/17/2005.\par \par \par Previous x-rays demonstrated mild to moderate GHOA\par Previous US guided subacromial and glenohumeral injection provided significant relief for the patient \par MRI of left shoulder demonstrates large retracted tears of the supraspinatus and infraspinatus tendons; severe supraspinatus and infraspinatus muscle atrophy\par Discussed with the patient ultimately he is a candidate for left reverse TSA \par Patient was approved for surgery\par he wishes to proceed with conservative treatment at this time \par Patient will follow up 2-3 months for reevaluation \par \par Patient understands that steroid injection may be temporary solution and he may ultimately require Reverse TSA.\par - will follow up in 2-3 months\par \par (1) We discussed a comprehensive treatment plans that included a prescription management plan involving the use of prescription strength medications to include Ibuprofen 600-800 mg TID, versus 500-650 mg Tylenol. We also discussed prescribing topical diclofenac (Voltaren gel) as well as once daily Meloxicam 15 mg.\par \par (2) The patient has More Than One chronic injuries/illnesses as outlined, discussed, and documented by ICD 10 codes listed, as well as the HPI and Plan section.\par There is a moderate risk of morbidity with further treatment, especially from use of prescription strength medications and possible side effects of these medications which include upset stomach and cardiac/renal issues with long term use were discussed.\par \par (3) I recommended that the patient follow-up with their medical physician to discuss any significant specific potential issues with long term use such as interactions with current medications or with exacerbation of underlying medical morbidities.\par \par \par Patient seen by Nico Londono PA-C under the direct supervision of Brandan Jensen M.D.\par \par \par \par \par  No cyanosis, clubbing or edema

## 2023-04-27 ENCOUNTER — RX RENEWAL (OUTPATIENT)
Age: 73
End: 2023-04-27

## 2023-05-02 ENCOUNTER — RX RENEWAL (OUTPATIENT)
Age: 73
End: 2023-05-02

## 2023-06-03 ENCOUNTER — LABORATORY RESULT (OUTPATIENT)
Age: 73
End: 2023-06-03

## 2023-06-06 LAB
ALBUMIN SERPL ELPH-MCNC: 4.5 G/DL
ALP BLD-CCNC: 48 U/L
ALT SERPL-CCNC: 29 U/L
ANION GAP SERPL CALC-SCNC: 12 MMOL/L
AST SERPL-CCNC: 16 U/L
BILIRUB SERPL-MCNC: 0.2 MG/DL
BUN SERPL-MCNC: 26 MG/DL
CALCIUM SERPL-MCNC: 9.9 MG/DL
CHLORIDE SERPL-SCNC: 103 MMOL/L
CHOLEST SERPL-MCNC: 147 MG/DL
CO2 SERPL-SCNC: 21 MMOL/L
CREAT SERPL-MCNC: 1.05 MG/DL
CREAT SPEC-SCNC: 59 MG/DL
EGFR: 74 ML/MIN/1.73M2
ESTIMATED AVERAGE GLUCOSE: 171 MG/DL
GLUCOSE SERPL-MCNC: 291 MG/DL
HBA1C MFR BLD HPLC: 7.6 %
HDLC SERPL-MCNC: 44 MG/DL
LDLC SERPL CALC-MCNC: 51 MG/DL
MICROALBUMIN 24H UR DL<=1MG/L-MCNC: 14 MG/DL
MICROALBUMIN/CREAT 24H UR-RTO: 236 MG/G
NONHDLC SERPL-MCNC: 103 MG/DL
POTASSIUM SERPL-SCNC: 4.4 MMOL/L
PROT SERPL-MCNC: 6.9 G/DL
SODIUM SERPL-SCNC: 137 MMOL/L
TRIGL SERPL-MCNC: 259 MG/DL
TSH SERPL-ACNC: 4.3 UIU/ML
VIT B12 SERPL-MCNC: 570 PG/ML

## 2023-06-07 ENCOUNTER — APPOINTMENT (OUTPATIENT)
Dept: ENDOCRINOLOGY | Facility: CLINIC | Age: 73
End: 2023-06-07
Payer: MEDICARE

## 2023-06-07 ENCOUNTER — RESULT CHARGE (OUTPATIENT)
Age: 73
End: 2023-06-07

## 2023-06-07 VITALS
BODY MASS INDEX: 28.34 KG/M2 | SYSTOLIC BLOOD PRESSURE: 136 MMHG | WEIGHT: 166 LBS | DIASTOLIC BLOOD PRESSURE: 82 MMHG | HEART RATE: 77 BPM | HEIGHT: 64 IN

## 2023-06-07 DIAGNOSIS — R21 RASH AND OTHER NONSPECIFIC SKIN ERUPTION: ICD-10-CM

## 2023-06-07 LAB — GLUCOSE BLDC GLUCOMTR-MCNC: 220

## 2023-06-07 PROCEDURE — 82962 GLUCOSE BLOOD TEST: CPT

## 2023-06-07 PROCEDURE — 99214 OFFICE O/P EST MOD 30 MIN: CPT | Mod: 25

## 2023-06-07 RX ORDER — HYDROCHLOROTHIAZIDE 12.5 MG/1
12.5 CAPSULE ORAL
Qty: 90 | Refills: 0 | Status: DISCONTINUED | COMMUNITY
Start: 2020-09-18 | End: 2023-06-07

## 2023-06-07 NOTE — ASSESSMENT
[FreeTextEntry1] : 73 yrs old male\par \par 1. T2DM comp by CAD s/p CABG and microalbuminuria - A1c slightly worse, hyperglycemia today (missed am MFN)\par - cont Tradjenta 5 mg daily\par - cont MFN  mg 2 tabs BID\par - stop Glipizide ER 5 mg BID, start jardiance 25 mg daily (per FAA guidelines cannot take YANES with SGLT2i)\par - cont to monitor FS, brad before flight, risks of hyperglycemia/hypoglycemia discussed\par - cont ARB\par - cardio f/u\par - adhere with diabetic diet\par \par 2. HLD - cont vytorin and fibrate\par \par 3. Hyponatremia - resolved off HCTZ\par \par 4. Left forearm rash ?poison ivy vs shingles, it is itchy and started yesterday after working in garden, advised urgent care or PCP follow up\par \par 5. mild TSH elevation, clinically euthyroid \par - repeat TFts  and check TpO ab before next visit\par

## 2023-06-07 NOTE — PHYSICAL EXAM
[Alert] : alert [No Acute Distress] : no acute distress [EOMI] : extra ocular movement intact [No Thyroid Nodules] : no palpable thyroid nodules [No Accessory Muscle Use] : no accessory muscle use [Clear to Auscultation] : lungs were clear to auscultation bilaterally [Normal S1, S2] : normal S1 and S2 [Normal Rate] : heart rate was normal [No Edema] : no peripheral edema [Not Tender] : non-tender [Soft] : abdomen soft [Normal Gait] : normal gait [Oriented x3] : oriented to person, place, and time [Normal Affect] : the affect was normal [Normal Insight/Judgement] : insight and judgment were intact [Normal Mood] : the mood was normal [de-identified] : DIEGO [de-identified] : Left forearm rash

## 2023-07-26 ENCOUNTER — NON-APPOINTMENT (OUTPATIENT)
Age: 73
End: 2023-07-26

## 2023-07-31 RX ORDER — BLOOD SUGAR DIAGNOSTIC
STRIP MISCELLANEOUS 4 TIMES DAILY
Qty: 4 | Refills: 3 | Status: ACTIVE | COMMUNITY
Start: 2023-07-31 | End: 1900-01-01

## 2023-10-04 ENCOUNTER — LABORATORY RESULT (OUTPATIENT)
Age: 73
End: 2023-10-04

## 2023-10-05 LAB
ALBUMIN SERPL ELPH-MCNC: 4.9 G/DL
ALP BLD-CCNC: 53 U/L
ALT SERPL-CCNC: 22 U/L
ANION GAP SERPL CALC-SCNC: 14 MMOL/L
AST SERPL-CCNC: 17 U/L
BILIRUB SERPL-MCNC: 0.4 MG/DL
BUN SERPL-MCNC: 37 MG/DL
CALCIUM SERPL-MCNC: 10.4 MG/DL
CHLORIDE SERPL-SCNC: 98 MMOL/L
CHOLEST SERPL-MCNC: 149 MG/DL
CO2 SERPL-SCNC: 21 MMOL/L
CREAT SERPL-MCNC: 1.05 MG/DL
CREAT SPEC-SCNC: 27 MG/DL
EGFR: 74 ML/MIN/1.73M2
ESTIMATED AVERAGE GLUCOSE: 177 MG/DL
GLUCOSE SERPL-MCNC: 138 MG/DL
HBA1C MFR BLD HPLC: 7.8 %
HDLC SERPL-MCNC: 50 MG/DL
LDLC SERPL CALC-MCNC: 69 MG/DL
MICROALBUMIN 24H UR DL<=1MG/L-MCNC: 4.2 MG/DL
MICROALBUMIN/CREAT 24H UR-RTO: 155 MG/G
NONHDLC SERPL-MCNC: 100 MG/DL
POTASSIUM SERPL-SCNC: 4.4 MMOL/L
PROT SERPL-MCNC: 7.7 G/DL
SODIUM SERPL-SCNC: 133 MMOL/L
T4 FREE SERPL-MCNC: 1.5 NG/DL
TRIGL SERPL-MCNC: 183 MG/DL
TSH SERPL-ACNC: 2.72 UIU/ML

## 2023-10-11 ENCOUNTER — APPOINTMENT (OUTPATIENT)
Dept: ENDOCRINOLOGY | Facility: CLINIC | Age: 73
End: 2023-10-11
Payer: MEDICARE

## 2023-10-11 VITALS
HEART RATE: 62 BPM | DIASTOLIC BLOOD PRESSURE: 82 MMHG | BODY MASS INDEX: 28 KG/M2 | SYSTOLIC BLOOD PRESSURE: 124 MMHG | WEIGHT: 164 LBS | HEIGHT: 64 IN

## 2023-10-11 DIAGNOSIS — R79.89 OTHER SPECIFIED ABNORMAL FINDINGS OF BLOOD CHEMISTRY: ICD-10-CM

## 2023-10-11 LAB — GLUCOSE BLDC GLUCOMTR-MCNC: 122

## 2023-10-11 PROCEDURE — 99215 OFFICE O/P EST HI 40 MIN: CPT | Mod: 25

## 2023-10-11 PROCEDURE — 82962 GLUCOSE BLOOD TEST: CPT

## 2023-10-31 ENCOUNTER — RX RENEWAL (OUTPATIENT)
Age: 73
End: 2023-10-31

## 2024-01-11 LAB
HCT VFR BLD CALC: 45 %
HGB BLD-MCNC: 14.8 G/DL
MCHC RBC-ENTMCNC: 27.8 PG
MCHC RBC-ENTMCNC: 32.9 GM/DL
MCV RBC AUTO: 84.4 FL
PLATELET # BLD AUTO: 228 K/UL
RBC # BLD: 5.33 M/UL
RBC # FLD: 13.5 %
WBC # FLD AUTO: 7.86 K/UL

## 2024-01-12 LAB
CHOLEST SERPL-MCNC: 131 MG/DL
CREAT SPEC-SCNC: 35 MG/DL
ESTIMATED AVERAGE GLUCOSE: 177 MG/DL
HBA1C MFR BLD HPLC: 7.8 %
HDLC SERPL-MCNC: 42 MG/DL
LDLC SERPL CALC-MCNC: 60 MG/DL
MICROALBUMIN 24H UR DL<=1MG/L-MCNC: 4.5 MG/DL
MICROALBUMIN/CREAT 24H UR-RTO: 130 MG/G
NONHDLC SERPL-MCNC: 89 MG/DL
TRIGL SERPL-MCNC: 168 MG/DL
TSH SERPL-ACNC: 2.28 UIU/ML
VIT B12 SERPL-MCNC: 1064 PG/ML

## 2024-01-16 ENCOUNTER — APPOINTMENT (OUTPATIENT)
Dept: ENDOCRINOLOGY | Facility: CLINIC | Age: 74
End: 2024-01-16

## 2024-02-16 ENCOUNTER — RX RENEWAL (OUTPATIENT)
Age: 74
End: 2024-02-16

## 2024-02-27 ENCOUNTER — RESULT CHARGE (OUTPATIENT)
Age: 74
End: 2024-02-27

## 2024-02-27 ENCOUNTER — APPOINTMENT (OUTPATIENT)
Dept: ENDOCRINOLOGY | Facility: CLINIC | Age: 74
End: 2024-02-27
Payer: MEDICARE

## 2024-02-27 VITALS
WEIGHT: 162.25 LBS | BODY MASS INDEX: 27.7 KG/M2 | OXYGEN SATURATION: 96 % | TEMPERATURE: 98 F | RESPIRATION RATE: 14 BRPM | HEART RATE: 61 BPM | HEIGHT: 64 IN | SYSTOLIC BLOOD PRESSURE: 124 MMHG | DIASTOLIC BLOOD PRESSURE: 70 MMHG

## 2024-02-27 DIAGNOSIS — E11.65 TYPE 2 DIABETES MELLITUS WITH HYPERGLYCEMIA: ICD-10-CM

## 2024-02-27 LAB — GLUCOSE BLDC GLUCOMTR-MCNC: 148

## 2024-02-27 PROCEDURE — 82962 GLUCOSE BLOOD TEST: CPT

## 2024-02-27 PROCEDURE — 99214 OFFICE O/P EST MOD 30 MIN: CPT

## 2024-02-27 PROCEDURE — G2211 COMPLEX E/M VISIT ADD ON: CPT

## 2024-02-27 RX ORDER — EMPAGLIFLOZIN 25 MG/1
25 TABLET, FILM COATED ORAL
Qty: 90 | Refills: 1 | Status: ACTIVE | COMMUNITY
Start: 2023-06-07 | End: 1900-01-01

## 2024-02-27 RX ORDER — METFORMIN ER 500 MG 500 MG/1
500 TABLET ORAL
Qty: 360 | Refills: 1 | Status: ACTIVE | COMMUNITY
Start: 2020-11-24 | End: 1900-01-01

## 2024-02-27 RX ORDER — LINAGLIPTIN 5 MG/1
5 TABLET, FILM COATED ORAL
Qty: 90 | Refills: 1 | Status: ACTIVE | COMMUNITY
Start: 2021-02-27 | End: 1900-01-01

## 2024-02-27 RX ORDER — GLIPIZIDE 5 MG/1
5 TABLET, FILM COATED, EXTENDED RELEASE ORAL
Qty: 180 | Refills: 1 | Status: ACTIVE | COMMUNITY
Start: 2021-02-27 | End: 1900-01-01

## 2024-02-27 NOTE — HISTORY OF PRESENT ILLNESS
[FreeTextEntry1] : Interval Hx - just started glipizide due to high sugars and since starting it the numbers improving, [employer] suspended his license due to diabetic medication regimen. he needs oral surgery, knee and shoulder surgery soon  SMBG: before starting glipizide -170's, after glipizide '-140's in past 2-3 days  Quality: Type 2 Severity: moderate Duration: 2013 Onset: blood test   ASSOCIATED SYMPTOMS/COMPLICATIONS: (+) albuminuria  on ARB (+) CAD s/p CABG 3 vessel 2004 s/p nuclear stress test normal 2022 eye exam no DR  MODIFYING FACTORS: worsening due to decreased activity Exercise: limited due to bad knee, and torn rotator cuff   Current DM meds: no Glipizide before flight Glipizide ER 5 mg BID before meals (started this past week-end) MFN  mg 2 tabs BID Tradjenta 5 mg daily Jardiance 25 mg daily ================================================================= HLD  on simvastatin/ezetimibe 80/10 mg nightly, fenofibrate 160 mg daily ================================================================

## 2024-02-27 NOTE — PHYSICAL EXAM
[Alert] : alert [No Acute Distress] : no acute distress [EOMI] : extra ocular movement intact [No Respiratory Distress] : no respiratory distress [Clear to Auscultation] : lungs were clear to auscultation bilaterally [Normal S1, S2] : normal S1 and S2 [Normal Rate] : heart rate was normal [No Edema] : no peripheral edema [Oriented x3] : oriented to person, place, and time [Normal Affect] : the affect was normal [Normal Insight/Judgement] : insight and judgment were intact [Normal Mood] : the mood was normal

## 2024-02-27 NOTE — REVIEW OF SYSTEMS
[Recent Weight Gain (___ Lbs)] : no recent weight gain [Recent Weight Loss (___ Lbs)] : no recent weight loss [Blurred Vision] : no blurred vision [Chest Pain] : no chest pain [Shortness Of Breath] : no shortness of breath [Nausea] : no nausea [Abdominal Pain] : no abdominal pain [Polyuria] : no polyuria [Nocturia] : no nocturia [Pain/Numbness of Digits] : no pain/numbness of digits [Polydipsia] : no polydipsia [FreeTextEntry9] : shoulder and knee pains

## 2024-02-27 NOTE — ASSESSMENT
[FreeTextEntry1] : 73 yr old male with 1. T2DM comp by CAD s/p CABG and microalbuminuria - A1c 7.8% and suboptimal. By history recent glucoses improved with addition of Glipizide. ([employer] suspended his license to dm med combo but he is okay with this) - cont Tradjenta 5 mg daily - cont MFN  mg 2 tabs BID - cont Glipizide ER 5 mg BID - cont Jardiance 25 mg daily - cont to monitor FS, brad before flight, risks of hyperglycemia/hypoglycemia discussed - cont ARB - cardio f/u - adhere with diabetic diet - discussed that A1c <8% for surgical procedures  2. HLD - LDL Chol at goal,  cont vytorin and fibrate  3. mild TSH elevation, clinically euthyroid -repeat testing with normal levels and neg TPO ab - no further testing needed

## 2024-06-11 LAB
ALBUMIN SERPL ELPH-MCNC: 4.7 G/DL
ALP BLD-CCNC: 41 U/L
ALT SERPL-CCNC: 28 U/L
ANION GAP SERPL CALC-SCNC: 15 MMOL/L
AST SERPL-CCNC: 22 U/L
BILIRUB SERPL-MCNC: 0.5 MG/DL
BUN SERPL-MCNC: 27 MG/DL
CALCIUM SERPL-MCNC: 9.8 MG/DL
CHLORIDE SERPL-SCNC: 104 MMOL/L
CHOLEST SERPL-MCNC: 138 MG/DL
CO2 SERPL-SCNC: 20 MMOL/L
CREAT SERPL-MCNC: 1.24 MG/DL
EGFR: 60 ML/MIN/1.73M2
ESTIMATED AVERAGE GLUCOSE: 146 MG/DL
GLUCOSE SERPL-MCNC: 130 MG/DL
HBA1C MFR BLD HPLC: 6.7 %
HDLC SERPL-MCNC: 46 MG/DL
LDLC SERPL CALC-MCNC: 68 MG/DL
NONHDLC SERPL-MCNC: 92 MG/DL
POTASSIUM SERPL-SCNC: 4.7 MMOL/L
PROT SERPL-MCNC: 7.1 G/DL
SODIUM SERPL-SCNC: 138 MMOL/L
TRIGL SERPL-MCNC: 140 MG/DL

## 2024-06-14 ENCOUNTER — APPOINTMENT (OUTPATIENT)
Dept: ENDOCRINOLOGY | Facility: CLINIC | Age: 74
End: 2024-06-14
Payer: MEDICARE

## 2024-06-14 VITALS
WEIGHT: 163 LBS | SYSTOLIC BLOOD PRESSURE: 130 MMHG | BODY MASS INDEX: 27.83 KG/M2 | HEART RATE: 56 BPM | OXYGEN SATURATION: 96 % | DIASTOLIC BLOOD PRESSURE: 76 MMHG | HEIGHT: 64 IN

## 2024-06-14 DIAGNOSIS — E11.59 TYPE 2 DIABETES MELLITUS WITH OTHER CIRCULATORY COMPLICATIONS: ICD-10-CM

## 2024-06-14 DIAGNOSIS — E11.29 TYPE 2 DIABETES MELLITUS WITH OTHER DIABETIC KIDNEY COMPLICATION: ICD-10-CM

## 2024-06-14 DIAGNOSIS — E78.5 HYPERLIPIDEMIA, UNSPECIFIED: ICD-10-CM

## 2024-06-14 DIAGNOSIS — R80.9 TYPE 2 DIABETES MELLITUS WITH OTHER DIABETIC KIDNEY COMPLICATION: ICD-10-CM

## 2024-06-14 LAB — GLUCOSE BLDC GLUCOMTR-MCNC: 109

## 2024-06-14 PROCEDURE — 82962 GLUCOSE BLOOD TEST: CPT

## 2024-06-14 PROCEDURE — 99214 OFFICE O/P EST MOD 30 MIN: CPT

## 2024-06-14 NOTE — REVIEW OF SYSTEMS
Quality 226: Preventive Care And Screening: Tobacco Use: Screening And Cessation Intervention: Patient screened for tobacco use and is an ex/non-smoker Detail Level: Detailed [Recent Weight Gain (___ Lbs)] : no recent weight gain [Recent Weight Loss (___ Lbs)] : no recent weight loss [Blurred Vision] : no blurred vision [Chest Pain] : no chest pain [Shortness Of Breath] : no shortness of breath [Nausea] : no nausea [Abdominal Pain] : no abdominal pain [Polyuria] : no polyuria [Nocturia] : no nocturia [Pain/Numbness of Digits] : no pain/numbness of digits [Polydipsia] : no polydipsia [FreeTextEntry9] : shoulder and knee pains

## 2024-06-14 NOTE — ASSESSMENT
[FreeTextEntry1] : 73 yr old male with 1. T2DM comp by CAD s/p CABG and microalbuminuria - improved control by history and A1c down to 6.7% - cont Tradjenta 5 mg daily - cont MFN  mg 2 tabs BID - cont Glipizide ER 5 mg BID - cont Jardiance 25 mg daily - cont to monitor FS, brad before flight, risks of hyperglycemia/hypoglycemia discussed - cont ARB - cardio f/u - adhere with diabetic diet - ok for shoulder surgery - repeat labs 1 week before next visit  2. HLD - LDL Chol at goal,  cont vytorin and fibrate  3. mild TSH elevation, clinically euthyroid -repeat testing with normal levels and neg TPO ab, TSH yearly

## 2024-06-14 NOTE — PHYSICAL EXAM
[Alert] : alert [No Acute Distress] : no acute distress [EOMI] : extra ocular movement intact [No Respiratory Distress] : no respiratory distress [Clear to Auscultation] : lungs were clear to auscultation bilaterally [Normal S1, S2] : normal S1 and S2 [Normal Rate] : heart rate was normal [No Edema] : no peripheral edema [Oriented x3] : oriented to person, place, and time [Normal Affect] : the affect was normal [Normal Insight/Judgement] : insight and judgment were intact [Normal Mood] : the mood was normal [Foot Ulcers] : no foot ulcers [2+] : 2+ in the dorsalis pedis [Vibration Dec.] : normal vibratory sensation at the level of the toes [Diminished Throughout Both Feet] : normal tactile sensation with monofilament testing throughout both feet

## 2024-06-14 NOTE — HISTORY OF PRESENT ILLNESS
[FreeTextEntry1] : Interval Hx - glucoses improved, has been watching diet  SMBG: 's, denies lows  Quality: Type 2 Severity: moderate Duration: 2013 Onset: blood test   ASSOCIATED SYMPTOMS/COMPLICATIONS: (+) albuminuria  on ARB (+) CAD s/p CABG 3 vessel 2004 s/p nuclear stress test normal 2022 eye exam no DR  MODIFYING FACTORS: worsening due to decreased activity Exercise: limited due to bad knee, and torn rotator cuff   Current DM meds: no Glipizide before flight Glipizide ER 5 mg BID before meals MFN  mg 2 tabs BID Tradjenta 5 mg daily Jardiance 25 mg daily ================================================================= HLD  on simvastatin/ezetimibe 80/10 mg nightly, fenofibrate 160 mg daily ================================================================

## 2024-06-14 NOTE — DATA REVIEWED
[FreeTextEntry1] : Labs 9/28/18 Cr 1.09 LDl chol 67 Tg 188 A1c 7.8% TSH 4.720 urine microalb/Cr 37  Labs 1/24/19 Cr 1.32, GFR 54 LDL chol 77, Tg 163 A1c 6.6% TSH 2.780, FT4 1.45 urine microalb/Cr 31  Labs 6/25/19 Gluc 133, A1c 7.4 CR 1.32, GFR 54 LDL 67, HDL 40, Tg 132 urine alb/Cr 50  Labs 10/30/19 Gluc 134, A1c 7.3% Cr 1.32, GFR 54 ALT 57 LDL 54, HDL 43, Tg 154 B12 866  labs 11/18/20  gluc 191, A1c 7.7 Cr 1.21. GFR 59 LDL 84, Tg 180, HDL 40 TSH 2.520 urine alb/Cr 109  Labs 2/23/21 Gluc 212, A1c 7.9 Cr 1.08, GFR 68 LDL 51, Tg 180, HDL 39 TSH 2.82 B12 679 urine alb/Cr 115  Labs 5/26/21 Gluc 143, A1c 8.4 Cr 1.06, GFR 69 LDL 59, HDL 38, Tg 154  Labs 9/17/21 Gluc 107, A1c 6.5 Cr 0.97, GFR 77 HDL 39, LDL 53, Tg 94 TSH 2.68 Hb 12.5 B12 734 urine alb/Cr 274  labs 3/1/22  Cr 1.15, GFR 67 A1c 6.7,gluc 110 LDL 60, Tg 191 Na 132

## 2024-08-06 ENCOUNTER — RX RENEWAL (OUTPATIENT)
Age: 74
End: 2024-08-06

## 2024-09-04 ENCOUNTER — RX RENEWAL (OUTPATIENT)
Age: 74
End: 2024-09-04

## 2024-10-01 ENCOUNTER — RX RENEWAL (OUTPATIENT)
Age: 74
End: 2024-10-01

## 2024-10-22 ENCOUNTER — APPOINTMENT (OUTPATIENT)
Dept: ENDOCRINOLOGY | Facility: CLINIC | Age: 74
End: 2024-10-22
Payer: MEDICARE

## 2024-10-22 VITALS
HEIGHT: 64 IN | WEIGHT: 165 LBS | DIASTOLIC BLOOD PRESSURE: 70 MMHG | SYSTOLIC BLOOD PRESSURE: 118 MMHG | HEART RATE: 56 BPM | BODY MASS INDEX: 28.17 KG/M2 | OXYGEN SATURATION: 96 %

## 2024-10-22 DIAGNOSIS — E78.5 HYPERLIPIDEMIA, UNSPECIFIED: ICD-10-CM

## 2024-10-22 DIAGNOSIS — E11.65 TYPE 2 DIABETES MELLITUS WITH HYPERGLYCEMIA: ICD-10-CM

## 2024-10-22 DIAGNOSIS — R80.9 TYPE 2 DIABETES MELLITUS WITH OTHER DIABETIC KIDNEY COMPLICATION: ICD-10-CM

## 2024-10-22 DIAGNOSIS — E11.29 TYPE 2 DIABETES MELLITUS WITH OTHER DIABETIC KIDNEY COMPLICATION: ICD-10-CM

## 2024-10-22 DIAGNOSIS — E11.59 TYPE 2 DIABETES MELLITUS WITH OTHER CIRCULATORY COMPLICATIONS: ICD-10-CM

## 2024-10-22 LAB — GLUCOSE BLDC GLUCOMTR-MCNC: 106

## 2024-10-22 PROCEDURE — G2211 COMPLEX E/M VISIT ADD ON: CPT

## 2024-10-22 PROCEDURE — 99215 OFFICE O/P EST HI 40 MIN: CPT

## 2024-10-22 PROCEDURE — 82962 GLUCOSE BLOOD TEST: CPT

## 2024-11-13 ENCOUNTER — RX RENEWAL (OUTPATIENT)
Age: 74
End: 2024-11-13

## 2024-11-14 ENCOUNTER — RX RENEWAL (OUTPATIENT)
Age: 74
End: 2024-11-14

## 2025-01-28 ENCOUNTER — RX RENEWAL (OUTPATIENT)
Age: 75
End: 2025-01-28

## 2025-02-27 ENCOUNTER — APPOINTMENT (OUTPATIENT)
Dept: ENDOCRINOLOGY | Facility: CLINIC | Age: 75
End: 2025-02-27

## 2025-03-10 ENCOUNTER — RX RENEWAL (OUTPATIENT)
Age: 75
End: 2025-03-10

## 2025-03-25 ENCOUNTER — RX RENEWAL (OUTPATIENT)
Age: 75
End: 2025-03-25

## 2025-04-02 ENCOUNTER — NON-APPOINTMENT (OUTPATIENT)
Age: 75
End: 2025-04-02

## 2025-04-04 ENCOUNTER — APPOINTMENT (OUTPATIENT)
Dept: ENDOCRINOLOGY | Facility: CLINIC | Age: 75
End: 2025-04-04
Payer: MEDICARE

## 2025-04-04 VITALS
HEIGHT: 64 IN | BODY MASS INDEX: 28 KG/M2 | SYSTOLIC BLOOD PRESSURE: 132 MMHG | HEART RATE: 62 BPM | DIASTOLIC BLOOD PRESSURE: 60 MMHG | OXYGEN SATURATION: 96 % | WEIGHT: 164 LBS

## 2025-04-04 LAB — GLUCOSE BLDC GLUCOMTR-MCNC: 135

## 2025-04-04 PROCEDURE — 99214 OFFICE O/P EST MOD 30 MIN: CPT

## 2025-04-04 PROCEDURE — G2211 COMPLEX E/M VISIT ADD ON: CPT

## 2025-04-04 PROCEDURE — 82962 GLUCOSE BLOOD TEST: CPT

## 2025-04-15 ENCOUNTER — TRANSCRIPTION ENCOUNTER (OUTPATIENT)
Age: 75
End: 2025-04-15

## 2025-05-13 ENCOUNTER — RX RENEWAL (OUTPATIENT)
Age: 75
End: 2025-05-13

## 2025-05-21 ENCOUNTER — TRANSCRIPTION ENCOUNTER (OUTPATIENT)
Age: 75
End: 2025-05-21

## 2025-07-01 ENCOUNTER — RX RENEWAL (OUTPATIENT)
Age: 75
End: 2025-07-01

## 2025-07-14 ENCOUNTER — RX RENEWAL (OUTPATIENT)
Age: 75
End: 2025-07-14

## 2025-08-19 ENCOUNTER — APPOINTMENT (OUTPATIENT)
Dept: ENDOCRINOLOGY | Facility: CLINIC | Age: 75
End: 2025-08-19
Payer: MEDICARE

## 2025-08-19 VITALS
BODY MASS INDEX: 27.83 KG/M2 | OXYGEN SATURATION: 98 % | SYSTOLIC BLOOD PRESSURE: 122 MMHG | WEIGHT: 163 LBS | HEIGHT: 64 IN | DIASTOLIC BLOOD PRESSURE: 70 MMHG | HEART RATE: 54 BPM

## 2025-08-19 DIAGNOSIS — E78.5 HYPERLIPIDEMIA, UNSPECIFIED: ICD-10-CM

## 2025-08-19 DIAGNOSIS — R80.9 TYPE 2 DIABETES MELLITUS WITH OTHER DIABETIC KIDNEY COMPLICATION: ICD-10-CM

## 2025-08-19 DIAGNOSIS — E11.29 TYPE 2 DIABETES MELLITUS WITH OTHER DIABETIC KIDNEY COMPLICATION: ICD-10-CM

## 2025-08-19 DIAGNOSIS — R60.0 LOCALIZED EDEMA: ICD-10-CM

## 2025-08-19 DIAGNOSIS — I10 ESSENTIAL (PRIMARY) HYPERTENSION: ICD-10-CM

## 2025-08-19 DIAGNOSIS — E11.59 TYPE 2 DIABETES MELLITUS WITH OTHER CIRCULATORY COMPLICATIONS: ICD-10-CM

## 2025-08-19 LAB — GLUCOSE BLDC GLUCOMTR-MCNC: 132

## 2025-08-19 PROCEDURE — 82962 GLUCOSE BLOOD TEST: CPT

## 2025-08-19 PROCEDURE — 99214 OFFICE O/P EST MOD 30 MIN: CPT

## 2025-08-19 PROCEDURE — G2211 COMPLEX E/M VISIT ADD ON: CPT

## 2025-09-12 ENCOUNTER — RX RENEWAL (OUTPATIENT)
Age: 75
End: 2025-09-12